# Patient Record
Sex: MALE | Race: WHITE | NOT HISPANIC OR LATINO | URBAN - METROPOLITAN AREA
[De-identification: names, ages, dates, MRNs, and addresses within clinical notes are randomized per-mention and may not be internally consistent; named-entity substitution may affect disease eponyms.]

---

## 2024-10-07 ENCOUNTER — INPATIENT (INPATIENT)
Facility: HOSPITAL | Age: 59
LOS: 1 days | Discharge: HOME HEALTH | End: 2024-10-09
Attending: HOSPITALIST | Admitting: HOSPITALIST
Payer: MEDICAID

## 2024-10-07 VITALS
WEIGHT: 240.08 LBS | RESPIRATION RATE: 16 BRPM | HEART RATE: 106 BPM | SYSTOLIC BLOOD PRESSURE: 198 MMHG | OXYGEN SATURATION: 98 % | TEMPERATURE: 98 F | DIASTOLIC BLOOD PRESSURE: 107 MMHG

## 2024-10-07 DIAGNOSIS — J45.909 UNSPECIFIED ASTHMA, UNCOMPLICATED: ICD-10-CM

## 2024-10-07 DIAGNOSIS — R73.9 HYPERGLYCEMIA, UNSPECIFIED: ICD-10-CM

## 2024-10-07 DIAGNOSIS — I10 ESSENTIAL (PRIMARY) HYPERTENSION: ICD-10-CM

## 2024-10-07 DIAGNOSIS — Z29.9 ENCOUNTER FOR PROPHYLACTIC MEASURES, UNSPECIFIED: ICD-10-CM

## 2024-10-07 DIAGNOSIS — Z90.49 ACQUIRED ABSENCE OF OTHER SPECIFIED PARTS OF DIGESTIVE TRACT: Chronic | ICD-10-CM

## 2024-10-07 DIAGNOSIS — J44.1 CHRONIC OBSTRUCTIVE PULMONARY DISEASE WITH (ACUTE) EXACERBATION: ICD-10-CM

## 2024-10-07 DIAGNOSIS — Z98.890 OTHER SPECIFIED POSTPROCEDURAL STATES: Chronic | ICD-10-CM

## 2024-10-07 DIAGNOSIS — J06.9 ACUTE UPPER RESPIRATORY INFECTION, UNSPECIFIED: ICD-10-CM

## 2024-10-07 LAB
ALBUMIN SERPL ELPH-MCNC: 4.1 G/DL — SIGNIFICANT CHANGE UP (ref 3.3–5)
ALP SERPL-CCNC: 75 U/L — SIGNIFICANT CHANGE UP (ref 40–120)
ALT FLD-CCNC: 18 U/L — SIGNIFICANT CHANGE UP (ref 4–41)
ANION GAP SERPL CALC-SCNC: 14 MMOL/L — SIGNIFICANT CHANGE UP (ref 7–14)
AST SERPL-CCNC: 18 U/L — SIGNIFICANT CHANGE UP (ref 4–40)
BASOPHILS # BLD AUTO: 0.06 K/UL — SIGNIFICANT CHANGE UP (ref 0–0.2)
BASOPHILS NFR BLD AUTO: 0.7 % — SIGNIFICANT CHANGE UP (ref 0–2)
BILIRUB SERPL-MCNC: 0.4 MG/DL — SIGNIFICANT CHANGE UP (ref 0.2–1.2)
BUN SERPL-MCNC: 12 MG/DL — SIGNIFICANT CHANGE UP (ref 7–23)
CALCIUM SERPL-MCNC: 9.2 MG/DL — SIGNIFICANT CHANGE UP (ref 8.4–10.5)
CHLORIDE SERPL-SCNC: 98 MMOL/L — SIGNIFICANT CHANGE UP (ref 98–107)
CO2 SERPL-SCNC: 23 MMOL/L — SIGNIFICANT CHANGE UP (ref 22–31)
CREAT SERPL-MCNC: 0.62 MG/DL — SIGNIFICANT CHANGE UP (ref 0.5–1.3)
EGFR: 109 ML/MIN/1.73M2 — SIGNIFICANT CHANGE UP
EOSINOPHIL # BLD AUTO: 0.33 K/UL — SIGNIFICANT CHANGE UP (ref 0–0.5)
EOSINOPHIL NFR BLD AUTO: 3.9 % — SIGNIFICANT CHANGE UP (ref 0–6)
FLUAV AG NPH QL: SIGNIFICANT CHANGE UP
FLUBV AG NPH QL: SIGNIFICANT CHANGE UP
GLUCOSE BLDC GLUCOMTR-MCNC: 488 MG/DL — CRITICAL HIGH (ref 70–99)
GLUCOSE SERPL-MCNC: 338 MG/DL — HIGH (ref 70–99)
HCT VFR BLD CALC: 45.6 % — SIGNIFICANT CHANGE UP (ref 39–50)
HGB BLD-MCNC: 15.7 G/DL — SIGNIFICANT CHANGE UP (ref 13–17)
IANC: 5.03 K/UL — SIGNIFICANT CHANGE UP (ref 1.8–7.4)
IMM GRANULOCYTES NFR BLD AUTO: 0.2 % — SIGNIFICANT CHANGE UP (ref 0–0.9)
LIDOCAIN IGE QN: 29 U/L — SIGNIFICANT CHANGE UP (ref 7–60)
LYMPHOCYTES # BLD AUTO: 2.26 K/UL — SIGNIFICANT CHANGE UP (ref 1–3.3)
LYMPHOCYTES # BLD AUTO: 26.9 % — SIGNIFICANT CHANGE UP (ref 13–44)
MCHC RBC-ENTMCNC: 30 PG — SIGNIFICANT CHANGE UP (ref 27–34)
MCHC RBC-ENTMCNC: 34.4 GM/DL — SIGNIFICANT CHANGE UP (ref 32–36)
MCV RBC AUTO: 87 FL — SIGNIFICANT CHANGE UP (ref 80–100)
MONOCYTES # BLD AUTO: 0.7 K/UL — SIGNIFICANT CHANGE UP (ref 0–0.9)
MONOCYTES NFR BLD AUTO: 8.3 % — SIGNIFICANT CHANGE UP (ref 2–14)
NEUTROPHILS # BLD AUTO: 5.03 K/UL — SIGNIFICANT CHANGE UP (ref 1.8–7.4)
NEUTROPHILS NFR BLD AUTO: 60 % — SIGNIFICANT CHANGE UP (ref 43–77)
NRBC # BLD: 0 /100 WBCS — SIGNIFICANT CHANGE UP (ref 0–0)
NRBC # FLD: 0 K/UL — SIGNIFICANT CHANGE UP (ref 0–0)
NT-PROBNP SERPL-SCNC: 43 PG/ML — SIGNIFICANT CHANGE UP
PLATELET # BLD AUTO: 327 K/UL — SIGNIFICANT CHANGE UP (ref 150–400)
POTASSIUM SERPL-MCNC: 4 MMOL/L — SIGNIFICANT CHANGE UP (ref 3.5–5.3)
POTASSIUM SERPL-SCNC: 4 MMOL/L — SIGNIFICANT CHANGE UP (ref 3.5–5.3)
PROT SERPL-MCNC: 7.3 G/DL — SIGNIFICANT CHANGE UP (ref 6–8.3)
RBC # BLD: 5.24 M/UL — SIGNIFICANT CHANGE UP (ref 4.2–5.8)
RBC # FLD: 12.3 % — SIGNIFICANT CHANGE UP (ref 10.3–14.5)
RSV RNA NPH QL NAA+NON-PROBE: SIGNIFICANT CHANGE UP
SARS-COV-2 RNA SPEC QL NAA+PROBE: SIGNIFICANT CHANGE UP
SODIUM SERPL-SCNC: 135 MMOL/L — SIGNIFICANT CHANGE UP (ref 135–145)
TROPONIN T, HIGH SENSITIVITY RESULT: 13 NG/L — SIGNIFICANT CHANGE UP
WBC # BLD: 8.4 K/UL — SIGNIFICANT CHANGE UP (ref 3.8–10.5)
WBC # FLD AUTO: 8.4 K/UL — SIGNIFICANT CHANGE UP (ref 3.8–10.5)

## 2024-10-07 PROCEDURE — 99223 1ST HOSP IP/OBS HIGH 75: CPT | Mod: GC

## 2024-10-07 PROCEDURE — 99285 EMERGENCY DEPT VISIT HI MDM: CPT

## 2024-10-07 PROCEDURE — 71046 X-RAY EXAM CHEST 2 VIEWS: CPT | Mod: 26

## 2024-10-07 RX ORDER — IPRATROPIUM BROMIDE AND ALBUTEROL SULFATE .5; 3 MG/3ML; MG/3ML
3 SOLUTION RESPIRATORY (INHALATION)
Refills: 0 | Status: COMPLETED | OUTPATIENT
Start: 2024-10-07 | End: 2024-10-07

## 2024-10-07 RX ORDER — SENNOSIDES 8.6 MG
2 TABLET ORAL AT BEDTIME
Refills: 0 | Status: DISCONTINUED | OUTPATIENT
Start: 2024-10-07 | End: 2024-10-09

## 2024-10-07 RX ORDER — IPRATROPIUM BROMIDE AND ALBUTEROL SULFATE .5; 3 MG/3ML; MG/3ML
3 SOLUTION RESPIRATORY (INHALATION) ONCE
Refills: 0 | Status: DISCONTINUED | OUTPATIENT
Start: 2024-10-07 | End: 2024-10-07

## 2024-10-07 RX ORDER — IPRATROPIUM BROMIDE AND ALBUTEROL SULFATE .5; 3 MG/3ML; MG/3ML
3 SOLUTION RESPIRATORY (INHALATION) ONCE
Refills: 0 | Status: COMPLETED | OUTPATIENT
Start: 2024-10-07 | End: 2024-10-07

## 2024-10-07 RX ORDER — GLUCAGON INJECTION, SOLUTION 0.5 MG/.1ML
1 INJECTION, SOLUTION SUBCUTANEOUS ONCE
Refills: 0 | Status: DISCONTINUED | OUTPATIENT
Start: 2024-10-07 | End: 2024-10-09

## 2024-10-07 RX ORDER — IPRATROPIUM BROMIDE AND ALBUTEROL SULFATE .5; 3 MG/3ML; MG/3ML
3 SOLUTION RESPIRATORY (INHALATION) EVERY 6 HOURS
Refills: 0 | Status: DISCONTINUED | OUTPATIENT
Start: 2024-10-07 | End: 2024-10-09

## 2024-10-07 RX ORDER — AZITHROMYCIN 250 MG/1
500 TABLET, FILM COATED ORAL ONCE
Refills: 0 | Status: COMPLETED | OUTPATIENT
Start: 2024-10-07 | End: 2024-10-07

## 2024-10-07 RX ORDER — PREDNISONE 5 MG/1
40 TABLET ORAL DAILY
Refills: 0 | Status: DISCONTINUED | OUTPATIENT
Start: 2024-10-08 | End: 2024-10-09

## 2024-10-07 RX ORDER — INFLUENZA VIRUS VACCINE 15; 15; 15; 15 UG/.5ML; UG/.5ML; UG/.5ML; UG/.5ML
0.5 SUSPENSION INTRAMUSCULAR ONCE
Refills: 0 | Status: DISCONTINUED | OUTPATIENT
Start: 2024-10-07 | End: 2024-10-09

## 2024-10-07 RX ORDER — INSULIN LISPRO 100/ML
VIAL (ML) SUBCUTANEOUS AT BEDTIME
Refills: 0 | Status: DISCONTINUED | OUTPATIENT
Start: 2024-10-07 | End: 2024-10-08

## 2024-10-07 RX ORDER — INSULIN LISPRO 100/ML
VIAL (ML) SUBCUTANEOUS
Refills: 0 | Status: DISCONTINUED | OUTPATIENT
Start: 2024-10-07 | End: 2024-10-08

## 2024-10-07 RX ORDER — ALCOHOL ANTISEPTIC PADS
25 PADS, MEDICATED (EA) TOPICAL ONCE
Refills: 0 | Status: DISCONTINUED | OUTPATIENT
Start: 2024-10-07 | End: 2024-10-09

## 2024-10-07 RX ORDER — ACETAMINOPHEN 325 MG
1000 TABLET ORAL ONCE
Refills: 0 | Status: COMPLETED | OUTPATIENT
Start: 2024-10-07 | End: 2024-10-07

## 2024-10-07 RX ORDER — SODIUM CHLORIDE IRRIG SOLUTION 0.9 %
1000 SOLUTION, IRRIGATION IRRIGATION
Refills: 0 | Status: DISCONTINUED | OUTPATIENT
Start: 2024-10-07 | End: 2024-10-09

## 2024-10-07 RX ORDER — ACETAMINOPHEN 325 MG
650 TABLET ORAL ONCE
Refills: 0 | Status: DISCONTINUED | OUTPATIENT
Start: 2024-10-07 | End: 2024-10-07

## 2024-10-07 RX ORDER — AZITHROMYCIN 250 MG/1
500 TABLET, FILM COATED ORAL DAILY
Refills: 0 | Status: COMPLETED | OUTPATIENT
Start: 2024-10-08 | End: 2024-10-09

## 2024-10-07 RX ORDER — ALCOHOL ANTISEPTIC PADS
15 PADS, MEDICATED (EA) TOPICAL ONCE
Refills: 0 | Status: DISCONTINUED | OUTPATIENT
Start: 2024-10-07 | End: 2024-10-09

## 2024-10-07 RX ORDER — MAGNESIUM SULFATE 500 MG/ML
2 VIAL (ML) INJECTION ONCE
Refills: 0 | Status: COMPLETED | OUTPATIENT
Start: 2024-10-07 | End: 2024-10-07

## 2024-10-07 RX ORDER — ALCOHOL ANTISEPTIC PADS
12.5 PADS, MEDICATED (EA) TOPICAL ONCE
Refills: 0 | Status: DISCONTINUED | OUTPATIENT
Start: 2024-10-07 | End: 2024-10-09

## 2024-10-07 RX ORDER — ENOXAPARIN SODIUM 150 MG/ML
40 INJECTION SUBCUTANEOUS EVERY 24 HOURS
Refills: 0 | Status: DISCONTINUED | OUTPATIENT
Start: 2024-10-07 | End: 2024-10-09

## 2024-10-07 RX ADMIN — IPRATROPIUM BROMIDE AND ALBUTEROL SULFATE 3 MILLILITER(S): .5; 3 SOLUTION RESPIRATORY (INHALATION) at 21:48

## 2024-10-07 RX ADMIN — AZITHROMYCIN 500 MILLIGRAM(S): 250 TABLET, FILM COATED ORAL at 16:10

## 2024-10-07 RX ADMIN — IPRATROPIUM BROMIDE AND ALBUTEROL SULFATE 3 MILLILITER(S): .5; 3 SOLUTION RESPIRATORY (INHALATION) at 09:55

## 2024-10-07 RX ADMIN — Medication 6 MILLIGRAM(S): at 09:29

## 2024-10-07 RX ADMIN — ENOXAPARIN SODIUM 40 MILLIGRAM(S): 150 INJECTION SUBCUTANEOUS at 22:53

## 2024-10-07 RX ADMIN — IPRATROPIUM BROMIDE AND ALBUTEROL SULFATE 3 MILLILITER(S): .5; 3 SOLUTION RESPIRATORY (INHALATION) at 12:12

## 2024-10-07 RX ADMIN — IPRATROPIUM BROMIDE AND ALBUTEROL SULFATE 3 MILLILITER(S): .5; 3 SOLUTION RESPIRATORY (INHALATION) at 09:33

## 2024-10-07 RX ADMIN — Medication 400 MILLIGRAM(S): at 09:29

## 2024-10-07 RX ADMIN — Medication 4: at 23:07

## 2024-10-07 RX ADMIN — Medication 150 GRAM(S): at 09:29

## 2024-10-07 RX ADMIN — Medication 2 TABLET(S): at 22:53

## 2024-10-07 RX ADMIN — IPRATROPIUM BROMIDE AND ALBUTEROL SULFATE 3 MILLILITER(S): .5; 3 SOLUTION RESPIRATORY (INHALATION) at 10:15

## 2024-10-07 NOTE — ED PROVIDER NOTE - ATTENDING CONTRIBUTION TO CARE
The patient is a 58y Male heavy smoker who has a past medical and surgery history of  Asthma PTED with Pt history of asthma, presents for fever, chest pain and shortness of breath for the past week, endorses pain to Lt side of chest, non-radiating, non- reproducible, afebrile.  fever, chest pain, shortness of breath     Vital Signs Last 24 Hrs  T(F): 98.1 HR: 106 BP: 198/107 RR: 16 SpO2: 98% (07 Oct 2024 08:50) (98% - 98%)  PE: as described; my additions and exceptions are noted in the chart    DATA:  EKG: pending at time of evaluation  LAB: Pending at time of evaluation    IMPRESSION/RISK:  Dx= COPD  Consideration include: nebs mag steroids given with significant response but not enough to warrant discharge at this time   Will continue nebs pt not requring NIV at this time   will also add azithro and check viral panel  Will reassess  Plan

## 2024-10-07 NOTE — H&P ADULT - PROBLEM SELECTOR PLAN 2
Patient has history of hypertension which he manages off medication outpatient. Systolic blood pressure fluctuates between 130s-190s/80s-100s. Patient endorsed history of anxiety prior to admission, also managed off of medication.    - Continue to monitor  - Consider initiation of low dose ARB if patient amenable

## 2024-10-07 NOTE — H&P ADULT - PROBLEM SELECTOR PLAN 1
The patient has a 40 pack-year smoking history and asthma and presented with acute hypoxia with SpO2 at 89%, improved following steroids, duonebs, and antibiotics initiation.    - On 2L NC, wean O2 as tolerated  - Continue duonebs q6h  - Continue prednisone 40mg PO for 5 day course  - Continue azithromycin 500mg for 3 day course  - RSV/Covid/Flu negative  - CXR clear The patient has a 40 pack-year smoking history and asthma and presented with acute hypoxia with SpO2 at 89%, improved following steroids, duonebs, and antibiotics initiation.    - On 2L NC, wean O2 as tolerated  - Continue duonebs q6h  - Continue prednisone 40mg PO for 5 day course  - Continue azithromycin 500mg for 3 day course  - Blood gas in AM  - RSV/Covid/Flu negative  - CXR clear    - Will require pulmonology follow-up outpatient

## 2024-10-07 NOTE — ED ADULT NURSE NOTE - OBJECTIVE STATEMENT
Patient came in with the complaints of sob, cough and fever started a week ago. Patient stated he using Nyquil and Tylenol with minimal relief.  No other complaints. Specimens collected and sent. Medications given as ordered. Patient tolerated well. Nursing care continues

## 2024-10-07 NOTE — ED PROVIDER NOTE - PHYSICAL EXAMINATION
Constitutional: Obese, well-appearing. No acute distress.   Head: Normocephalic, atraumatic.   Eyes: PERRL. EOMI. No conjunctival pallor.   ENT: Dry mucous membranes. Uvula midline. No pharyngeal erythema or exudates.  Neck: No LAD. Supple.  CVS: Tachycardic rate, regular rhythm. Normal S1, S2. No murmurs, rubs, or gallops. No peripheral edema noted.   Respiratory: No respiratory distress. Diffuse inspiratory and expiratory wheezes bilaterally. No rales, or rhonchi.   Abdomen: Abd is soft and non-distended. No tenderness. No rebound, guarding, or rigidity.   MSK: No CVA tenderness bilaterally.   Neuro: Alert and oriented to person, place, and time. Follows commands. Moves all extremities. CN II-XII intact. Sensation grossly intact. Strength is 5/5 and symmetric bilaterally.   Skin: Warm and dry. No rashes.   Psych: Normal affect, cooperative.

## 2024-10-07 NOTE — H&P ADULT - ASSESSMENT
The patient is a 58-year-old male with past medical history of untreated hypertension, asthma, daily smoker with 40-pack year smoking history, and anxiety presenting with acute hypoxia secondary to likely COPD exacerbation.

## 2024-10-07 NOTE — H&P ADULT - HISTORY OF PRESENT ILLNESS
The patient is a 58-year-old male with past medical history of untreated hypertension, asthma, obesity, daily smoker, and anxiety who presented for evaluation of left-sided chest pain that began at 4 AM on 10/7.  The patient had been experiencing URI-like symptoms over the past week with subjective fever, chills, generalized myalgias, productive cough, nasal congestion, and rhinorrhea which the patient had been treating with Tylenol and DayQuil/NyQuil as needed. On the morning of 10/7, the patient awoke with left-sided sharp chest pain and a sensation of numbness and tingling to his toes as well as his left upper extremity. The patient prefers to avoid seeking medical care, but was heavily encouraged by his family to report to the hospital given his presenting symptoms.    ED Course  T: Afebrile, HR: 106, BP: 198/107, SpO2 98% RA    Patient stated that his pain was intermittent and associated with difficulty breathing and wheezing.  He denied any nausea, vomiting, diarrhea, abdominal pain, dysuria, hematuria, flank pain, headache, changes in vision, changes in speech, weakness, recent travel, or sick contacts.    On arrival to the ED, the patient appeared comfortable at the bedside with diffuse inspiratory and expiratory wheezes to bilateral lung fields. EKG on admission consistent with sinus tach and 1st degree AV block with non-specific T-wave abnormalities.    Patient given duonebs, magnesium sulfate, and dexamethasone. Viral panel negative. CXR within normal limits. Labs unremarkable. Patient with continued inspiratory and expiratory wheezes on exam and documented hypoxia to 89% on room air, now on 2LNC with improvement to 96%. Azithromycin added for COPD exacerbation. The patient is a 58-year-old male with past medical history of untreated hypertension, asthma, obesity, daily smoker, and anxiety who presented for evaluation of left-sided chest pain that began at 4 AM on 10/7.  The patient had been experiencing URI-like symptoms over the past week with subjective fever, chills, generalized myalgias, productive cough, nasal congestion, and rhinorrhea which the patient had been treating with Tylenol and DayQuil/NyQuil as needed. On the morning of 10/7, the patient awoke with left-sided sharp chest pain and a sensation of numbness and tingling to his toes as well as his left upper extremity. The patient prefers to avoid seeking medical care, but was heavily encouraged by his family to report to the hospital given his presenting symptoms.    ED Course  T: Afebrile, HR: 106, BP: 198/107, SpO2 98% RA    Patient stated that his pain was intermittent and associated with difficulty breathing and wheezing.  He denied any nausea, vomiting, diarrhea, abdominal pain, dysuria, hematuria, flank pain, headache, changes in vision, changes in speech, weakness, recent travel, or sick contacts.    EKG on admission consistent with sinus tach and 1st degree AV block with non-specific T-wave abnormalities. Subsequent troponin was wnl (13). The patient appeared comfortable at the bedside with diffuse inspiratory and expiratory wheezes to bilateral lung fields. The patient eventually desaturated to 89% on RA.    Patient given duonebs, magnesium sulfate, and dexamethasone. Viral panel negative. CXR within normal limits. Labs unremarkable. Patient placed on 2LNC with improvement in SpO2 to 96%-99%. Azithromycin added for COPD exacerbation, and patient admitted to medicine for continued airway monitoring and management of COPD exacerbation.

## 2024-10-07 NOTE — H&P ADULT - PROBLEM SELECTOR PLAN 3
The patient has a history of asthma which he manages at home with doses of over the counter ephedrine 1-2 times/week.    - O2 management as above  - Airway management as above The patient has a history of asthma which he manages at home with doses of over the counter ephedrine 1-2 times/week.    - O2 management as above  - Airway management as above  - Pulmonology follow-up outpatient

## 2024-10-07 NOTE — ED PROVIDER NOTE - CLINICAL SUMMARY MEDICAL DECISION MAKING FREE TEXT BOX
Fellow MD Suresh Oneil: 58-year-old male with past medical history of of untreated hypertension, asthma, obesity, daily smoker, presenting to the ED for evaluation of left-sided chest pain that began at 4 AM.  As per patient, he states that he has been experiencing URI-like symptoms over the past week with subjective fever, chills, generalized myalgias, productive cough, nasal congestion, rhinorrhea which she has been treating with Tylenol and DayQuil/NyQuil as needed.  This morning, the patient awoke with left-sided sharp chest pain and a sensation of numbness and tingling to his toes as well as his left upper extremity.  Patient states that the pain is intermittent and associated with difficulty breathing and wheezing.  He denies any nausea, vomiting, diarrhea, abdominal pain, dysuria, hematuria, flank pain, headache, changes in vision, changes in speech, weakness, and any additional complaints at this time.  No recent travel or sick contacts.    On arrival to the ED, the patient is hypertensive, tachycardic, afebrile, and satting 98% on room air.  Patient appears comfortable at the bedside.  However, patient has diffuse inspiratory and expiratory wheezes to bilateral lung fields.  Plan for DuoNebs, magnesium sulfate, and dexamethasone.  Will also send viral panel to evaluate for infectious causes of symptoms.  Plan for chest x-ray, labs, troponin, BMP, EKG to evaluate for chest pain.  Patient agreeable with plan. Fellow MD Suresh Oneil: 58-year-old male with past medical history of of untreated hypertension, asthma, obesity, daily smoker, presenting to the ED for evaluation of left-sided chest pain that began at 4 AM.  As per patient, he states that he has been experiencing URI-like symptoms over the past week with subjective fever, chills, generalized myalgias, productive cough, nasal congestion, rhinorrhea which she has been treating with Tylenol and DayQuil/NyQuil as needed.  This morning, the patient awoke with left-sided sharp chest pain and a sensation of numbness and tingling to his toes as well as his left upper extremity.  Patient states that the pain is intermittent and associated with difficulty breathing and wheezing.  He denies any nausea, vomiting, diarrhea, abdominal pain, dysuria, hematuria, flank pain, headache, changes in vision, changes in speech, weakness, and any additional complaints at this time.  No recent travel or sick contacts.    On arrival to the ED, the patient is hypertensive, tachycardic, afebrile, and satting 98% on room air.  Patient appears comfortable at the bedside.  However, patient has diffuse inspiratory and expiratory wheezes to bilateral lung fields.  Plan for DuoNebs, magnesium sulfate, and dexamethasone.  Will also send viral panel to evaluate for infectious causes of symptoms.  Plan for chest x-ray, labs, troponin, BMP, EKG to evaluate for chest pain.  Patient agreeable with plan. Viral panel negative. CXR within normal limits. Labs unremarkable. Patient with continued inspiratory and expiratory wheezes on exam and documented hypoxia to 89% on room air, now on 2LNC with improvement to 96%. Azithromycin added for COPD exacerbation.

## 2024-10-07 NOTE — H&P ADULT - NSHPLABSRESULTS_GEN_ALL_CORE
LABS:                         15.7   8.40  )-----------( 327      ( 07 Oct 2024 09:15 )             45.6     10-07    135  |  98  |  12  ----------------------------<  338[H]  4.0   |  23  |  0.62    Ca    9.2      07 Oct 2024 09:15    TPro  7.3  /  Alb  4.1  /  TBili  0.4  /  DBili  x   /  AST  18  /  ALT  18  /  AlkPhos  75  10-07      Urinalysis Basic - ( 07 Oct 2024 09:15 )    Color: x / Appearance: x / SG: x / pH: x  Gluc: 338 mg/dL / Ketone: x  / Bili: x / Urobili: x   Blood: x / Protein: x / Nitrite: x   Leuk Esterase: x / RBC: x / WBC x   Sq Epi: x / Non Sq Epi: x / Bacteria: x                RADIOLOGY, EKG & ADDITIONAL TESTS: LABS:                         15.7   8.40  )-----------( 327      ( 07 Oct 2024 09:15 )             45.6     10-07    135  |  98  |  12  ----------------------------<  338[H]  4.0   |  23  |  0.62    Ca    9.2      07 Oct 2024 09:15    TPro  7.3  /  Alb  4.1  /  TBili  0.4  /  DBili  x   /  AST  18  /  ALT  18  /  AlkPhos  75  10-07      Urinalysis Basic - ( 07 Oct 2024 09:15 )    Color: x / Appearance: x / SG: x / pH: x  Gluc: 338 mg/dL / Ketone: x  / Bili: x / Urobili: x   Blood: x / Protein: x / Nitrite: x   Leuk Esterase: x / RBC: x / WBC x   Sq Epi: x / Non Sq Epi: x / Bacteria: x                RADIOLOGY, EKG & ADDITIONAL TESTS:    EKG Date/Time: 07-Oct-2024 08:52    Interpretation: sinus tachycardia  Axis: Normal  Rate: 106  Prior EKG Status: there is no prior EKG available for comparison  ST/T Wave: Non-specific T wave abnormalities.  QTC: 443 ms  QRS: 98 ms  IN: 204 ms  Other Findings: 1st degree AV block.      Xray Chest 2 Views PA/Lat    FINDINGS: Normal cardiac silhouette and normal pulmonary vasculature with   no consolidation, effusion, pneumothorax or acute osseous findings.    IMPRESSION:  Negative radiographs

## 2024-10-07 NOTE — ED PROVIDER NOTE - CARE PLAN
Principal Discharge DX:	URI (upper respiratory infection)  Secondary Diagnosis:	Acute asthma exacerbation  Secondary Diagnosis:	Hypertension   1

## 2024-10-07 NOTE — ED ADULT TRIAGE NOTE - CHIEF COMPLAINT QUOTE
Pt history of asthma, presents for fever, chest pain and shortness of breath for the past week, endorses pain to Lt side of chest, non-radiating, non- reproducible, afebrile.

## 2024-10-07 NOTE — H&P ADULT - PROBLEM SELECTOR PLAN 5
Activity: Advance as tolerated  DVT ppx: Lovenox   Diet: DASH/TLC  Dispo: Pending PT   Directive: Full Code  Electrolytes: Replete as necessary

## 2024-10-07 NOTE — H&P ADULT - NSHPPHYSICALEXAM_GEN_ALL_CORE
Vital Signs Last 12 Hrs  T(F): 98.8 (10-07-24 @ 16:39), Max: 98.8 (10-07-24 @ 16:39)  HR: 96 (10-07-24 @ 16:39) (90 - 106)  BP: 144/76 (10-07-24 @ 16:39) (142/77 - 198/107)  BP(mean): 106 (10-07-24 @ 09:53) (106 - 106)  RR: 16 (10-07-24 @ 16:39) (16 - 16)  SpO2: 99% (10-07-24 @ 16:39) (98% - 99%)    PHYSICAL EXAM:  Constitutional: NAD, comfortable in bed.  HEENT: NC/AT, PERRLA, EOMI, no conjunctival pallor or scleral icterus, MMM  Neck: Supple, no JVD  Respiratory: CTA B/L. No w/r/r.   Cardiovascular: RRR, normal S1 and S2, no m/r/g.   Gastrointestinal: +BS, soft NTND, no guarding or rebound tenderness, no palpable masses   Extremities: wwp; no cyanosis, clubbing or edema.   Vascular: Pulses equal and strong throughout.   Neurological: AAOx3, no CN deficits, strength and sensation intact throughout.   Skin: No gross skin abnormalities or rashes Vital Signs Last 12 Hrs  T(F): 98.8 (10-07-24 @ 16:39), Max: 98.8 (10-07-24 @ 16:39)  HR: 96 (10-07-24 @ 16:39) (90 - 106)  BP: 144/76 (10-07-24 @ 16:39) (142/77 - 198/107)  BP(mean): 106 (10-07-24 @ 09:53) (106 - 106)  RR: 16 (10-07-24 @ 16:39) (16 - 16)  SpO2: 99% (10-07-24 @ 16:39) (98% - 99%)    PHYSICAL EXAM:  Constitutional: NAD, comfortable in bed.  HEENT: NC/AT, PERRLA, EOMI, no conjunctival pallor or scleral icterus, MMM  Neck: Supple, no JVD  Respiratory: Trace expiratory and inspiratory wheezes b/l  Cardiovascular: RRR, normal S1 and S2, no m/r/g.   Gastrointestinal: +BS, soft NTND, no guarding or rebound tenderness, no palpable masses   Extremities: wwp; no cyanosis, clubbing or edema.   Vascular: Pulses equal and strong throughout.   Neurological: AAOx3, no CN deficits, strength and sensation intact throughout.   Skin: No gross skin abnormalities or rashes

## 2024-10-07 NOTE — H&P ADULT - PROBLEM SELECTOR PLAN 4
The patient presented with elevated blood glucose to 338. Patient does not have a history of diabetes nor takes any antiglycemic agents. Patient denied PO intake prior to arrival to hospital, and patient is starting round of steroid treatment for COPD.    - A1c in AM  - Lipid panel in AM  - Continue to trend  - Low threshold to initiate low insulin correctional scale with corresponding fingersticks to manage hyperglycemia given current treatment regimen The patient presented with elevated blood glucose to 338. Patient does not have a history of diabetes nor takes any antiglycemic agents. Patient denied PO intake prior to arrival to hospital, and patient is starting round of steroid treatment for COPD.    - A1c in AM  - Lipid panel in AM  - Continue to trend  - Start low-dose insulin correctional scale pre-meal and at bedtime  - Fingersticks per protocol

## 2024-10-07 NOTE — H&P ADULT - NSHPREVIEWOFSYSTEMS_GEN_ALL_CORE
Gen: No fever, normal appetite  Eyes: No eye irritation or discharge  ENT: No ear pain, congestion, sore throat  Resp: No cough or trouble breathing  Cardiovascular: No chest pain or palpitation  Gastroenteric: No nausea/vomiting, diarrhea, constipation  :  No change in urine output; no dysuria  MS: No joint or muscle pain  Skin: No rashes  Neuro: No headache; no abnormal movements  Remainder negative, except as per the HPI Gen: No fever, normal appetite  Eyes: No eye irritation or discharge  ENT: No ear pain, congestion, sore throat  Resp: See HPI  Cardiovascular: No chest pain or palpitation  Gastroenteric: No nausea/vomiting, diarrhea, constipation  : Increased urinary frequency with strain, non-painful, no hematuria  MS: No joint or muscle pain  Skin: No rashes  Neuro: No headache; no abnormal movements  Remainder negative, except as per the HPI

## 2024-10-07 NOTE — H&P ADULT - ATTENDING COMMENTS
Pt seen and examined by me on 10/7. Agree with findings and plan as detailed above which was discussed with Dr. Mariee.  Pt with self-reported h/o HTN, asthma, anxiety, current smoker who presented with few days of URI symptoms and new onset chest tightness, found to have hypoxia and diffuse wheezing on exam. Suspect asthma/COPD exacerbation. Flu/COVID/RSV negative and CXR unremarkable. C/w steroids, azithromycin, duonebs. Needs outpatient referral for PCP and pulmonary evaluation.

## 2024-10-08 DIAGNOSIS — E78.5 HYPERLIPIDEMIA, UNSPECIFIED: ICD-10-CM

## 2024-10-08 DIAGNOSIS — E11.9 TYPE 2 DIABETES MELLITUS WITHOUT COMPLICATIONS: ICD-10-CM

## 2024-10-08 DIAGNOSIS — R73.9 HYPERGLYCEMIA, UNSPECIFIED: ICD-10-CM

## 2024-10-08 LAB
A1C WITH ESTIMATED AVERAGE GLUCOSE RESULT: 11.4 % — HIGH (ref 4–5.6)
ALBUMIN SERPL ELPH-MCNC: 4.1 G/DL — SIGNIFICANT CHANGE UP (ref 3.3–5)
ALP SERPL-CCNC: 78 U/L — SIGNIFICANT CHANGE UP (ref 40–120)
ALT FLD-CCNC: 22 U/L — SIGNIFICANT CHANGE UP (ref 4–41)
ANION GAP SERPL CALC-SCNC: 16 MMOL/L — HIGH (ref 7–14)
AST SERPL-CCNC: 14 U/L — SIGNIFICANT CHANGE UP (ref 4–40)
BASOPHILS # BLD AUTO: 0.02 K/UL — SIGNIFICANT CHANGE UP (ref 0–0.2)
BASOPHILS NFR BLD AUTO: 0.2 % — SIGNIFICANT CHANGE UP (ref 0–2)
BILIRUB SERPL-MCNC: 0.2 MG/DL — SIGNIFICANT CHANGE UP (ref 0.2–1.2)
BLOOD GAS VENOUS COMPREHENSIVE RESULT: SIGNIFICANT CHANGE UP
BUN SERPL-MCNC: 17 MG/DL — SIGNIFICANT CHANGE UP (ref 7–23)
CALCIUM SERPL-MCNC: 9.5 MG/DL — SIGNIFICANT CHANGE UP (ref 8.4–10.5)
CHLORIDE SERPL-SCNC: 98 MMOL/L — SIGNIFICANT CHANGE UP (ref 98–107)
CHOLEST SERPL-MCNC: 269 MG/DL — HIGH
CO2 SERPL-SCNC: 22 MMOL/L — SIGNIFICANT CHANGE UP (ref 22–31)
CREAT SERPL-MCNC: 0.66 MG/DL — SIGNIFICANT CHANGE UP (ref 0.5–1.3)
EGFR: 107 ML/MIN/1.73M2 — SIGNIFICANT CHANGE UP
EOSINOPHIL # BLD AUTO: 0.03 K/UL — SIGNIFICANT CHANGE UP (ref 0–0.5)
EOSINOPHIL NFR BLD AUTO: 0.2 % — SIGNIFICANT CHANGE UP (ref 0–6)
ESTIMATED AVERAGE GLUCOSE: 280 — SIGNIFICANT CHANGE UP
GLUCOSE BLDC GLUCOMTR-MCNC: 193 MG/DL — HIGH (ref 70–99)
GLUCOSE BLDC GLUCOMTR-MCNC: 273 MG/DL — HIGH (ref 70–99)
GLUCOSE BLDC GLUCOMTR-MCNC: 287 MG/DL — HIGH (ref 70–99)
GLUCOSE BLDC GLUCOMTR-MCNC: 357 MG/DL — HIGH (ref 70–99)
GLUCOSE BLDC GLUCOMTR-MCNC: 486 MG/DL — CRITICAL HIGH (ref 70–99)
GLUCOSE SERPL-MCNC: 284 MG/DL — HIGH (ref 70–99)
HCT VFR BLD CALC: 45 % — SIGNIFICANT CHANGE UP (ref 39–50)
HDLC SERPL-MCNC: 43 MG/DL — SIGNIFICANT CHANGE UP
HGB BLD-MCNC: 15.3 G/DL — SIGNIFICANT CHANGE UP (ref 13–17)
IANC: 8.88 K/UL — HIGH (ref 1.8–7.4)
IMM GRANULOCYTES NFR BLD AUTO: 0.3 % — SIGNIFICANT CHANGE UP (ref 0–0.9)
LACTATE SERPL-SCNC: 2.1 MMOL/L — HIGH (ref 0.5–2)
LIPID PNL WITH DIRECT LDL SERPL: 166 MG/DL — HIGH
LYMPHOCYTES # BLD AUTO: 1.99 K/UL — SIGNIFICANT CHANGE UP (ref 1–3.3)
LYMPHOCYTES # BLD AUTO: 16.4 % — SIGNIFICANT CHANGE UP (ref 13–44)
MAGNESIUM SERPL-MCNC: 2.2 MG/DL — SIGNIFICANT CHANGE UP (ref 1.6–2.6)
MCHC RBC-ENTMCNC: 30 PG — SIGNIFICANT CHANGE UP (ref 27–34)
MCHC RBC-ENTMCNC: 34 GM/DL — SIGNIFICANT CHANGE UP (ref 32–36)
MCV RBC AUTO: 88.2 FL — SIGNIFICANT CHANGE UP (ref 80–100)
MONOCYTES # BLD AUTO: 1.14 K/UL — HIGH (ref 0–0.9)
MONOCYTES NFR BLD AUTO: 9.4 % — SIGNIFICANT CHANGE UP (ref 2–14)
MRSA PCR RESULT.: SIGNIFICANT CHANGE UP
NEUTROPHILS # BLD AUTO: 8.88 K/UL — HIGH (ref 1.8–7.4)
NEUTROPHILS NFR BLD AUTO: 73.5 % — SIGNIFICANT CHANGE UP (ref 43–77)
NON HDL CHOLESTEROL: 226 MG/DL — HIGH
NRBC # BLD: 0 /100 WBCS — SIGNIFICANT CHANGE UP (ref 0–0)
NRBC # FLD: 0 K/UL — SIGNIFICANT CHANGE UP (ref 0–0)
PHOSPHATE SERPL-MCNC: 3.5 MG/DL — SIGNIFICANT CHANGE UP (ref 2.5–4.5)
PLATELET # BLD AUTO: 358 K/UL — SIGNIFICANT CHANGE UP (ref 150–400)
POTASSIUM SERPL-MCNC: 4.3 MMOL/L — SIGNIFICANT CHANGE UP (ref 3.5–5.3)
POTASSIUM SERPL-SCNC: 4.3 MMOL/L — SIGNIFICANT CHANGE UP (ref 3.5–5.3)
PROT SERPL-MCNC: 7.3 G/DL — SIGNIFICANT CHANGE UP (ref 6–8.3)
RBC # BLD: 5.1 M/UL — SIGNIFICANT CHANGE UP (ref 4.2–5.8)
RBC # FLD: 12.4 % — SIGNIFICANT CHANGE UP (ref 10.3–14.5)
S AUREUS DNA NOSE QL NAA+PROBE: SIGNIFICANT CHANGE UP
SODIUM SERPL-SCNC: 136 MMOL/L — SIGNIFICANT CHANGE UP (ref 135–145)
TRIGL SERPL-MCNC: 298 MG/DL — HIGH
WBC # BLD: 12.1 K/UL — HIGH (ref 3.8–10.5)
WBC # FLD AUTO: 12.1 K/UL — HIGH (ref 3.8–10.5)

## 2024-10-08 PROCEDURE — 99255 IP/OBS CONSLTJ NEW/EST HI 80: CPT | Mod: GC

## 2024-10-08 PROCEDURE — 99233 SBSQ HOSP IP/OBS HIGH 50: CPT | Mod: GC

## 2024-10-08 RX ORDER — AMLODIPINE BESYLATE 5 MG
5 TABLET ORAL DAILY
Refills: 0 | Status: DISCONTINUED | OUTPATIENT
Start: 2024-10-08 | End: 2024-10-09

## 2024-10-08 RX ORDER — INSULIN LISPRO 100/ML
7 VIAL (ML) SUBCUTANEOUS
Refills: 0 | Status: DISCONTINUED | OUTPATIENT
Start: 2024-10-08 | End: 2024-10-08

## 2024-10-08 RX ORDER — INSULIN LISPRO 100/ML
10 VIAL (ML) SUBCUTANEOUS
Refills: 0 | Status: DISCONTINUED | OUTPATIENT
Start: 2024-10-08 | End: 2024-10-09

## 2024-10-08 RX ORDER — INSULIN LISPRO 100/ML
VIAL (ML) SUBCUTANEOUS
Refills: 0 | Status: DISCONTINUED | OUTPATIENT
Start: 2024-10-08 | End: 2024-10-09

## 2024-10-08 RX ORDER — ALBUTEROL 90 MCG
2 AEROSOL (GRAM) INHALATION EVERY 6 HOURS
Refills: 0 | Status: DISCONTINUED | OUTPATIENT
Start: 2024-10-08 | End: 2024-10-09

## 2024-10-08 RX ORDER — INSULIN LISPRO 100/ML
VIAL (ML) SUBCUTANEOUS AT BEDTIME
Refills: 0 | Status: DISCONTINUED | OUTPATIENT
Start: 2024-10-08 | End: 2024-10-09

## 2024-10-08 RX ORDER — INSULIN GLARGINE 300 U/ML
22 INJECTION, SOLUTION SUBCUTANEOUS AT BEDTIME
Refills: 0 | Status: DISCONTINUED | OUTPATIENT
Start: 2024-10-08 | End: 2024-10-09

## 2024-10-08 RX ORDER — AMLODIPINE BESYLATE 5 MG
5 TABLET ORAL DAILY
Refills: 0 | Status: DISCONTINUED | OUTPATIENT
Start: 2024-10-08 | End: 2024-10-08

## 2024-10-08 RX ORDER — INSULIN GLARGINE 300 U/ML
21 INJECTION, SOLUTION SUBCUTANEOUS AT BEDTIME
Refills: 0 | Status: DISCONTINUED | OUTPATIENT
Start: 2024-10-08 | End: 2024-10-08

## 2024-10-08 RX ADMIN — IPRATROPIUM BROMIDE AND ALBUTEROL SULFATE 3 MILLILITER(S): .5; 3 SOLUTION RESPIRATORY (INHALATION) at 16:34

## 2024-10-08 RX ADMIN — PREDNISONE 40 MILLIGRAM(S): 5 TABLET ORAL at 05:47

## 2024-10-08 RX ADMIN — Medication 3: at 08:40

## 2024-10-08 RX ADMIN — Medication 5 MILLIGRAM(S): at 10:23

## 2024-10-08 RX ADMIN — INSULIN GLARGINE 22 UNIT(S): 300 INJECTION, SOLUTION SUBCUTANEOUS at 22:32

## 2024-10-08 RX ADMIN — Medication 2 PUFF(S): at 21:49

## 2024-10-08 RX ADMIN — Medication 3: at 18:18

## 2024-10-08 RX ADMIN — Medication 2 TABLET(S): at 21:49

## 2024-10-08 RX ADMIN — Medication 7 UNIT(S): at 12:38

## 2024-10-08 RX ADMIN — AZITHROMYCIN 500 MILLIGRAM(S): 250 TABLET, FILM COATED ORAL at 12:39

## 2024-10-08 RX ADMIN — ENOXAPARIN SODIUM 40 MILLIGRAM(S): 150 INJECTION SUBCUTANEOUS at 18:18

## 2024-10-08 RX ADMIN — IPRATROPIUM BROMIDE AND ALBUTEROL SULFATE 3 MILLILITER(S): .5; 3 SOLUTION RESPIRATORY (INHALATION) at 03:50

## 2024-10-08 RX ADMIN — IPRATROPIUM BROMIDE AND ALBUTEROL SULFATE 3 MILLILITER(S): .5; 3 SOLUTION RESPIRATORY (INHALATION) at 10:29

## 2024-10-08 RX ADMIN — Medication 5: at 12:39

## 2024-10-08 RX ADMIN — Medication 7 UNIT(S): at 18:18

## 2024-10-08 NOTE — SBIRT NOTE ADULT - NSSBIRTDRGBRIEFINTDET_GEN_A_CORE
SW intern met with pt at bedside. Pt reported daily cannabis use. SW intern and pt discussed reasons for use. Pt reported recreational use and shared that he uses in social setting with his friends.  SW intern and pt also discuss motivation to cease use. Pt reported that he is able to stop using when he has to, and did not express concern about his marijuana use and COPD diagnosis. Pt was offered resources for substance use treatment.  Pt. refused resources.

## 2024-10-08 NOTE — CONSULT NOTE ADULT - ASSESSMENT
The patient is a 58y Male with PMH of anxiety and asthma a/w COPD exacerbation. Endocrinology consulted for uncontrolled T2DM complicated by steroid-induced hyperglycemia.    #Uncontrolled Type 2 Diabetes Mellitus   #Steroid-induced hyperglycemia  - No Hx DM, new diagnosis, no routine medical care x15 years  - A1C with Estimated Average Glucose Result: 11.4 % (10-08-24)  - eGFR: 107 mL/min/1.73m2 (10-08-24)  - Weight (kg): 108.9 (10-07-24)  - glucose 300-400s, no evidence of DKA on labs  - steroid regimen: decadron 6mg IV x1 (10/7), now prednisone 40mg daily x4d (10/8-11)      INPATIENT PLAN:  - Recommend Lantus 22 units QHS   - Recommend Admelog 10 units TID before meals (HOLD if NPO or not eating)  - Recommend moderate dose admelog correction scale TIDQAC and separate moderate dose scale QHS  - Please check FSG before meals and QHS, or q6h while NPO  - Inpatient glucose goals: <140 pre-meal, <180 random  - consistent carb diet when eating  - nutrition consult placed  - please inform endocrine team with any planned changes in steroid regimen      DISCHARGE PLANNING:  - Discharge recs pending clinical course: will need to clarify insurance status. Ideally would DC on basal insulin (dose TBD) + GLP1RA + metformin. If GLP1RA not covered alternative would be basal/bolus insulin + metformin. Will need to check repeat lactate prior to discharge.   - Pt would also benefit from CGM if he has insurance coverage  - will need Endocrinology follow up. Please provide clinic info in [place] paperwork for patient to make an appointment: Endocrinology [facility]: [address] [place] [address]. [address] [address]. Tel: [phone]    #Hypertension  - Goal BP <130/80  - Management as per primary team  - check urine microalbumin level as outpatient    #Hyperlipidemia  - LDL goal <70  - start moderate dose statin  - check lipid panel as outpatient    Hero Sebastian MD  Endocrine Fellow  For nonurgent matters (including consults or followup questions), please email [email or web address] or [email or web address]. For urgent matters, please call answering service at [phone] (weekdays), [phone] (nights/weekends).    The patient is a 58y Male with PMH of anxiety and asthma a/w COPD exacerbation. Endocrinology consulted for uncontrolled T2DM complicated by steroid-induced hyperglycemia.    #Uncontrolled Type 2 Diabetes Mellitus   #Steroid-induced hyperglycemia  - No Hx DM, new diagnosis, no routine medical care x15 years  - A1C with Estimated Average Glucose Result: 11.4 % (10-08-24)  - eGFR: 107 mL/min/1.73m2 (10-08-24)  - Weight (kg): 108.9 (10-07-24)  - glucose 300-400s, no evidence of DKA on labs  - steroid regimen: decadron 6mg IV x1 (10/7), now prednisone 40mg daily x4d (10/8-11)      INPATIENT PLAN:  - Recommend Lantus 22 units QHS   - Recommend Admelog 10 units TID before meals (HOLD if NPO or not eating)  - Recommend moderate dose admelog correction scale TIDQAC and separate moderate dose scale QHS  - Please check FSG before meals and QHS, or q6h while NPO  - Inpatient glucose goals: <140 pre-meal, <180 random  - consistent carb diet when eating  - nutrition consult placed  - please inform endocrine team with any planned changes in steroid regimen      DISCHARGE PLANNING:  - Discharge recs pending clinical course: will need to clarify insurance status. Ideally would DC on basal insulin (dose TBD) + GLP1RA + metformin. If GLP1RA not covered alternative would be basal/bolus insulin + metformin. Will need to check repeat lactate prior to discharge.   - Pt would also benefit from CGM if he has insurance coverage  - RN insulin teaching ordered  - will need Endocrinology follow up. Please provide clinic info in [place] paperwork for patient to make an appointment: Endocrinology [facility]: [address] [place] [address]. [address]. [place] [address]. Tel: [phone]    #Hypertension  - Goal BP <130/80  - Management as per primary team  - check urine microalbumin level as outpatient    #Hyperlipidemia  - LDL goal <70  - start moderate dose statin  - check lipid panel as outpatient    Hero Sebastian MD  Endocrine Fellow  For nonurgent matters (including consults or followup questions), please email [email or web address] or [email or web address]. For urgent matters, please call answering service at [phone] (weekdays), [phone] (nights/weekends).    The patient is a 58y Male with PMH of anxiety and asthma a/w COPD exacerbation. Endocrinology consulted for uncontrolled T2DM complicated by steroid-induced hyperglycemia.    #Uncontrolled Type 2 Diabetes Mellitus   #Steroid-induced hyperglycemia  - No Hx DM, new diagnosis, no routine medical care x15 years  - A1C with Estimated Average Glucose Result: 11.4 % (10-08-24)  - eGFR: 107 mL/min/1.73m2 (10-08-24)  - Weight (kg): 108.9 (10-07-24)  - glucose 300-400s, no evidence of DKA on labs  - steroid regimen: decadron 6mg IV x1 (10/7), now prednisone 40mg daily x4d (10/8-11)      INPATIENT PLAN:  - Recommend Lantus 22 units QHS   - Recommend Admelog 10 units TID before meals (HOLD if NPO or not eating)  - Recommend moderate dose admelog correction scale TIDQAC and separate moderate dose scale QHS  - Please check FSG before meals and QHS, or q6h while NPO  - Inpatient glucose goals: <140 pre-meal, <180 random  - consistent carb diet when eating  - nutrition consult placed  - please inform endocrine team with any planned changes in steroid regimen      DISCHARGE PLANNING:  - Discharge recs pending clinical course: will need to clarify insurance status. Ideally would DC on basal insulin (dose TBD) + GLP1RA + metformin. If GLP1RA not covered alternative would be basal/bolus insulin + metformin. Will need to check repeat lactate prior to discharge.   - Pt would also benefit from CGM if he has insurance coverage  - RN insulin teaching ordered  - will need Endocrinology follow up. Please provide clinic info in [place] paperwork for patient to make an appointment: Endocrinology [facility]: [address] [place] [address]. [address] [address]. Tel: [phone]. Will wait to contact schedulers until insurance is active    #Hypertension  - Goal BP <130/80  - Management as per primary team  - check urine microalbumin level as outpatient    #Hyperlipidemia  - LDL goal <70  - start moderate dose statin  - check lipid panel as outpatient    Hero Sebastian MD  Endocrine Fellow  For nonurgent matters (including consults or followup questions), please email [email or web address] or [email or web address]. For urgent matters, please call answering service at [phone] (weekdays), [phone] (nights/weekends).

## 2024-10-08 NOTE — SBIRT NOTE ADULT - NSSBIRTSAVECONSULT_GEN_A_CORE
Referred by: Godwin Casanova MD; Medical Diagnosis (from order):    Diagnosis Information      Diagnosis    719.45 (ICD-9-CM) - M25.551, M25.552 (ICD-10-CM) - Bilateral hip pain                Physical Therapy -  Daily Treatment Note    Visit:  8     SUBJECTIVE                                                                                                             Better this week than last and gradual improvements.  Soreness noticed in the lower back in the mornings.  Patient was scrapbooking over the weekend which may have contributed to some soreness.    Functional Change: Noticing more when sitting on one foot.    Pain / Symptoms:  Patient denies pain/symptoms  Pain rating (out of 10): Current: 3     OBJECTIVE                                                                                                                       Palpation:   Right: Ilipsoas: tenderness and trigger point;         Alignment:   Supine:     Anterior Superior Iliac Spine: Right: inferior   Comments / Details: FRS Left L5  Right anterior ilium  Apparent Leg Length:   Medial Malleoli in Supine: left appears longer       TREATMENT                                                                                                                  Therapeutic Exercise:  Standing global spinal movement patterns - forward/backward, side bending, rotation (with heel pop)    Standing Hip Flexor stretch Right/Left   Manual Therapy:  Spinal and pelvic positioning assessment  Muscle Energy Technique correction of dysfunctions as noted  Supine Trigger Point Release R>L psoas      Skilled input: verbal instruction/cues, tactile instruction/cues, facilitation and posture correction  education/instruction on: transition to personal training. Used of Helix machine as warm up for personal training versus the elliptical.     Writer verbally educated and received verbal consent for hand placement, positioning of patient, and techniques to be performed today  from patient for hand placement and palpation for techniques and therapist position for techniques as described above and how they are pertinent to the patient's plan of care.    Home Exercise Program: (*above indicates provided as part of home exercise program)  11/27/19: hooklying pelvic tilt, hooklying TA activation with marching and kicks, hooklying DL bridge with marching, sidelying clamshell and reverse clamshell, sidelying hip abduction   12/11/19: modified side planks, DL squats with 4# med ball raise  12/18/19: Prone TPR psoas with ball   12/23/19: Standing Multihip - issued red TheraBand   1/8/2020: Pelvic titls to neutral + Transverse Abdominus contraction (supine, Quadruped, sitting, Standing)  1/15/2020: Standing global spinal movement patterns      ASSESSMENT                                                                                                                 Decreased \"clicking\" noted since last week.  Noticing good improvements overall and pleased with progress.    Pain/symptoms after session: 0 and 1  Patient Education:   Results of above outlined education: Verbalizes understanding and Demonstrates understanding   PLAN                                                                                                                             Suggestions for next session as indicated: Progress per plan of care  Right/Left hip rotational stabilization  Right hip rotational progressions from non weight-bearing to weight-bearing        Procedures and total treatment time documented Time Entry flowsheet.     Complete

## 2024-10-08 NOTE — CONSULT NOTE ADULT - ATTENDING COMMENTS
58M without recent medical care for years with newly diagnosed diabetes likely type 2 HbA1c 11.%, also now on prednisone for asthma/COPD.  Inpatient proceed with basal bolus insulin, DM education.  Thought to be uninsured initially but patient states he is having Purple activated.  DC on basal insulin/ GLP1/ metformin (repeat lactate).  RD consult.    Froy Rich MD  Division of Endocrinology  Pager: [phone]    If after 6PM or before 9AM, or on weekends/holidays, please call endocrine answering service for assistance ([phone]).  For nonurgent matters email [email or web address] for assistance.

## 2024-10-08 NOTE — PROGRESS NOTE ADULT - PROBLEM SELECTOR PLAN 4
The patient presented with elevated blood glucose to 338. Patient does not have a history of diabetes nor takes any antiglycemic agents. Patient denied PO intake prior to arrival to hospital, and patient is starting round of steroid treatment for COPD.    - A1c in AM  - Lipid panel in AM  - Continue to trend  - Start low-dose insulin correctional scale pre-meal and at bedtime  - Fingersticks per protocol The patient presented with elevated blood glucose to 338. Patient does not have a history of diabetes nor takes any antiglycemic agents. Patient denied PO intake prior to arrival to hospital, and patient is starting round of steroid treatment for COPD.    - A1c 11.6%  - Started on admelog 7 pre-meal with lantus 21 at bedtime  - Continue low-dose insulin correctional scale pre-meal and at bedtime  - Fingersticks per protocol  - Endocrinology consulted for discharge recs i/s/o uninsured status

## 2024-10-08 NOTE — PROVIDER CONTACT NOTE (OTHER) - ACTION/TREATMENT ORDERED:
MD notified and aware. 4U insulin given as instructed. MD states no more interventions at this time. Continue to monitor.

## 2024-10-08 NOTE — CONSULT NOTE ADULT - SUBJECTIVE AND OBJECTIVE BOX
HPI:  The patient is a 58-year-old male with past medical history of untreated hypertension, asthma, obesity, daily smoker, and anxiety who presented for evaluation of left-sided chest pain that began at 4 AM on 10/7.  The patient had been experiencing URI-like symptoms over the past week with subjective fever, chills, generalized myalgias, productive cough, nasal congestion, and rhinorrhea which the patient had been treating with Tylenol and DayQuil/NyQuil as needed. On the morning of 10/7, the patient awoke with left-sided sharp chest pain and a sensation of numbness and tingling to his toes as well as his left upper extremity. The patient prefers to avoid seeking medical care, but was heavily encouraged by his family to report to the hospital given his presenting symptoms.    ED Course  T: Afebrile, HR: 106, BP: 198/107, SpO2 98% RA    Patient stated that his pain was intermittent and associated with difficulty breathing and wheezing.  He denied any nausea, vomiting, diarrhea, abdominal pain, dysuria, hematuria, flank pain, headache, changes in vision, changes in speech, weakness, recent travel, or sick contacts.    EKG on admission consistent with sinus tach and 1st degree AV block with non-specific T-wave abnormalities. Subsequent troponin was wnl (13). The patient appeared comfortable at the bedside with diffuse inspiratory and expiratory wheezes to bilateral lung fields. The patient eventually desaturated to 89% on RA.    Patient given duonebs, magnesium sulfate, and dexamethasone. Viral panel negative. CXR within normal limits. Labs unremarkable. Patient placed on 2LNC with improvement in SpO2 to 96%-99%. Azithromycin added for COPD exacerbation, and patient admitted to medicine for continued airway monitoring and management of COPD exacerbation. (07 Oct 2024 16:40)      DIABETES HX:  Pt denies any history of DM. Has not seen a doctor in 15 years. Complains of polyuria and polydipsia x6 months. His weight "fluctuates." No FHx of DM either. A1c 11.4% this admission    PAST MEDICAL & SURGICAL HISTORY:  Hypertension      Current every day smoker      Asthma      S/P knee surgery      History of appendectomy          FAMILY HISTORY:  Family history of stroke (Mother)      MEDICATIONS  (STANDING):  albuterol/ipratropium for Nebulization 3 milliLiter(s) Nebulizer every 6 hours  amLODIPine   Tablet 5 milliGRAM(s) Oral daily  azithromycin   Tablet 500 milliGRAM(s) Oral daily  dextrose 5%. 1000 milliLiter(s) (50 mL/Hr) IV Continuous <Continuous>  dextrose 5%. 1000 milliLiter(s) (100 mL/Hr) IV Continuous <Continuous>  dextrose 50% Injectable 12.5 Gram(s) IV Push once  dextrose 50% Injectable 25 Gram(s) IV Push once  dextrose 50% Injectable 25 Gram(s) IV Push once  dextrose Oral Gel 15 Gram(s) Oral once  enoxaparin Injectable 40 milliGRAM(s) SubCutaneous every 24 hours  glucagon  Injectable 1 milliGRAM(s) IntraMuscular once  influenza   Vaccine 0.5 milliLiter(s) IntraMuscular once  insulin glargine Injectable (LANTUS) 21 Unit(s) SubCutaneous at bedtime  insulin lispro (ADMELOG) corrective regimen sliding scale   SubCutaneous three times a day before meals  insulin lispro (ADMELOG) corrective regimen sliding scale   SubCutaneous at bedtime  insulin lispro Injectable (ADMELOG) 7 Unit(s) SubCutaneous three times a day before meals  predniSONE   Tablet 40 milliGRAM(s) Oral daily  senna 2 Tablet(s) Oral at bedtime    MEDICATIONS  (PRN):      Allergies    No Known Allergies    Intolerances      Review of Systems:  Constitutional: No fever  Eyes: No blurry vision  Neuro: No tremors  HEENT: No pain  Cardiovascular: No chest pain, palpitations  Respiratory: No SOB, no cough  GI: No nausea, vomiting, abdominal pain  : No dysuria  Skin: no rash  Psych: no depression  Endocrine: no polyuria, polydipsia  Hem/lymph: no swelling  Osteoporosis: no fractures    ALL OTHER SYSTEMS REVIEWED AND NEGATIVE    PHYSICAL EXAM:  VITALS: T(C): 36.3 (10-08-24 @ 13:32)  T(F): 97.3 (10-08-24 @ 13:32), Max: 98.4 (10-07-24 @ 22:16)  HR: 89 (10-08-24 @ 16:49) (70 - 99)  BP: 147/83 (10-08-24 @ 13:32) (147/83 - 166/79)  RR:  (17 - 20)  SpO2:  (90% - 98%)  Wt(kg): --  GENERAL: NAD, well-groomed, well-developed  EYES: No proptosis, no lid lag, anicteric  HEENT:  Atraumatic, Normocephalic, moist mucous membranes  RESPIRATORY: Normal respiratory effort; no audible wheezing  SKIN: Dry, intact, No rashes or lesions  MUSCULOSKELETAL: Full range of motion, normal strength  NEURO: sensation intact, extraocular movements intact, no tremor  PSYCH: Alert and oriented x 3, normal affect, normal mood  CUSHING'S SIGNS: no striae                          15.3   12.10 )-----------( 358      ( 08 Oct 2024 06:20 )             45.0       10-08    136  |  98  |  17  ----------------------------<  284[H]  4.3   |  22  |  0.66    eGFR: 107    Ca    9.5      10-08  Mg     2.20     10-08  Phos  3.5     10-08    TPro  7.3  /  Alb  4.1  /  TBili  0.2  /  DBili  x   /  AST  14  /  ALT  22  /  AlkPhos  78  10-08      A1C with Estimated Average Glucose Result: 11.4 % (10-08-24 @ 06:20)      CAPILLARY BLOOD GLUCOSE      POCT Blood Glucose.: 273 mg/dL (08 Oct 2024 17:55)  POCT Blood Glucose.: 357 mg/dL (08 Oct 2024 12:11)  POCT Blood Glucose.: 287 mg/dL (08 Oct 2024 08:35)  POCT Blood Glucose.: 486 mg/dL (07 Oct 2024 23:04)  POCT Blood Glucose.: 488 mg/dL (07 Oct 2024 23:00)      Thyroid Function Tests:  albuterol/ipratropium for Nebulization 3 milliLiter(s) Nebulizer every 6 hours  amLODIPine   Tablet 5 milliGRAM(s) Oral daily  azithromycin   Tablet 500 milliGRAM(s) Oral daily  dextrose 5%. 1000 milliLiter(s) IV Continuous <Continuous>  dextrose 5%. 1000 milliLiter(s) IV Continuous <Continuous>  dextrose 50% Injectable 12.5 Gram(s) IV Push once  dextrose 50% Injectable 25 Gram(s) IV Push once  dextrose 50% Injectable 25 Gram(s) IV Push once  dextrose Oral Gel 15 Gram(s) Oral once  enoxaparin Injectable 40 milliGRAM(s) SubCutaneous every 24 hours  glucagon  Injectable 1 milliGRAM(s) IntraMuscular once  influenza   Vaccine 0.5 milliLiter(s) IntraMuscular once  insulin glargine Injectable (LANTUS) 21 Unit(s) SubCutaneous at bedtime  insulin lispro (ADMELOG) corrective regimen sliding scale   SubCutaneous three times a day before meals  insulin lispro (ADMELOG) corrective regimen sliding scale   SubCutaneous at bedtime  insulin lispro Injectable (ADMELOG) 7 Unit(s) SubCutaneous three times a day before meals  predniSONE   Tablet 40 milliGRAM(s) Oral daily  senna 2 Tablet(s) Oral at bedtime      10-08 Chol 269[H] Direct LDL -- LDL calculated 166[H] HDL 43 Trig 298[H]    Radiology:

## 2024-10-08 NOTE — PROGRESS NOTE ADULT - PROBLEM SELECTOR PLAN 1
The patient has a 40 pack-year smoking history and asthma and presented with acute hypoxia with SpO2 at 89%, improved following steroids, duonebs, and antibiotics initiation.    - On 2L NC, wean O2 as tolerated  - Continue duonebs q6h  - Continue prednisone 40mg PO for 5 day course  - Continue azithromycin 500mg for 3 day course  - Blood gas in AM  - RSV/Covid/Flu negative  - CXR clear    - Will require pulmonology follow-up outpatient The patient has a 40 pack-year smoking history and asthma and presented with acute hypoxia with SpO2 at 89%, improved following steroids, duonebs, and antibiotics initiation.    - On RA, pending ambulatory sat  - Continue duonebs q6h  - Continue prednisone 40mg PO for 5 day course (10/7 - 10/11)  - Continue azithromycin 500mg for 3 day course (10/7 - 10/9)  - VBG wnl  - RSV/Covid/Flu negative  - CXR clear    - Will require pulmonology follow-up outpatient

## 2024-10-08 NOTE — PROGRESS NOTE ADULT - PROBLEM SELECTOR PLAN 5
Activity: Advance as tolerated  DVT ppx: Lovenox   Diet: DASH/TLC  Dispo: Pending PT   Directive: Full Code  Electrolytes: Replete as necessary Activity: Advance as tolerated  DVT ppx: Lovenox   Diet: DASH/TLC  Dispo: Home  Directive: Full Code  Electrolytes: Replete as necessary

## 2024-10-08 NOTE — PROVIDER CONTACT NOTE (OTHER) - ASSESSMENT
ao4, no acute distress noted, patient denied SOB or respiratory distress
FS is 488 and 486, patient alert and oriented. No complaints, no acute distress noted.

## 2024-10-08 NOTE — PROGRESS NOTE ADULT - ATTENDING COMMENTS
Pt with h/o HTN, asthma, anxiety, current smoker who presented with few days of URI symptoms and new onset chest tightness, found to have hypoxia and diffuse wheezing on exam. Suspect asthma/COPD exacerbation. Also found to have hyperglycemia with A1c 11.1% c/w T2DM (new diagnosis on this admission.)   - still wheezing on my exam this morning but overall much improved; no longer requiring supplemental O2  - Flu/COVID/RSV negative  - CXR unremarkable  - C/w prednisone to complete 5 days  - 3 days azithromycin  - c/w duonebs and transition to MDI tomorrow  - outpatient pulmonary evaluation  - new dx T2DM with SIH - started weight based lantus/admelog; endo consult appreciated  - HLD - start statin  - d/c planning - SW/CM eval

## 2024-10-08 NOTE — PROVIDER CONTACT NOTE (OTHER) - BACKGROUND
58y M admitted for acute upper respiratory infection
58y M admitted for acute upper respiratory infection

## 2024-10-08 NOTE — PHARMACOTHERAPY INTERVENTION NOTE - COMMENTS
Patient educated on A1c, goal A1c, S&S hyperglycemia, hypoglycemia and treatment, healthy plate, sources of carbohydrates, goal blood glucose, basics of using a glucometer, and when to check BG, pt verbalized understanding. Will need to return to teach insulin/medications once endocrine does consult. Pt encouraged for outpt f/u with medicine and endocrine. He states he has medicaid now (will have to make sure active on discharge).  Patient educated on A1c, goal A1c, S&S hyperglycemia, hypoglycemia and treatment, healthy plate, sources of carbohydrates, goal blood glucose, basics of using a glucometer, and when to check BG, pt verbalized understanding. Will need to return to teach insulin/medications once endocrine does consult. Pt encouraged for outpt f/u with medicine and endocrine. He states he has medicaid now (will have to make sure active on discharge).      10/9/24: Patient's Medicaid is active. Counseled patient on discharge plan Lantus 24 units qHS, Trulicity 0.75 mG once a week, and metformin 500 mG BID x 1 week, then increase to 1000 mG BID with food; patient performed good return demonstration. Counseled pt on where to inject insulin and Trulicity (abdomen, outer thighs), rotating injection site to prevent lipodystrophy, proper storage, and sharps disposal. Counseled the patient on how to apply and use the Freestyle Anna 3 plus sensors/reader. Showed patient where to apply the Freestyle Anna sensor and the sensor was successfully placed on the back of his right arm. Counseled patient how often to check his blood glucose, when to check his fingerstick, when to remove the sensor (after 2 weeks of use or if he is going for an Xray/MRI/CT scan/fluoroscopy procedure, etc.), and what to do if the sensor falls off before two weeks (cannot reuse, can call the company for a replacement). Also discussed the alarm settings on the reader as well as not to take more than 500 mG of Vitamin C as this can falsely elevate his blood glucose values. Patient verbalized understanding. Reviewed goal blood glucose (BG), healthy plate, effects of prednisone on BG, and follow-up care (has appointment 10/23/24).

## 2024-10-08 NOTE — PROGRESS NOTE ADULT - SUBJECTIVE AND OBJECTIVE BOX
INTERVAL HPI/OVERNIGHT EVENTS:  Patient was seen and examined at bedside. As per nurse and patient, no o/n events, patient resting comfortably. No complaints at this time. Patient denies: fever, chills, dizziness, weakness, HA, Changes in vision, CP, palpitations, SOB, cough, N/V/D/C, dysuria, changes in bowel movements, LE edema. ROS otherwise negative.    VITAL SIGNS:  T(F): 97.4 (10-08-24 @ 05:22)  HR: 95 (10-08-24 @ 05:22)  BP: 166/79 (10-08-24 @ 05:22)  RR: 18 (10-08-24 @ 05:22)  SpO2: 96% (10-08-24 @ 05:22)  Wt(kg): --    PHYSICAL EXAM:    General: WN/WD NAD  Neurology: A&Ox3, nonfocal, SPRING x 4  Eyes: PERRLA/ EOMI, Gross vision intact  ENT/Neck: Neck supple, trachea midline, No JVD, Gross hearing intact  Respiratory: CTA B/L, No wheezing, rales, rhonchi  CV: RRR, +S1/S2, -S3/S4, no murmurs, rubs or gallops  Abdominal: Soft, NT, ND +BS, No HSM  MSK: 5/5 strength UE/LE bilaterally  Extremities: No edema, 2+ peripheral pulses  Skin: No Rashes, Hematoma, Ecchymosis  Incisions:   Tubes:    MEDICATIONS  (STANDING):  albuterol/ipratropium for Nebulization 3 milliLiter(s) Nebulizer every 6 hours  amLODIPine   Tablet 5 milliGRAM(s) Oral daily  azithromycin   Tablet 500 milliGRAM(s) Oral daily  dextrose 5%. 1000 milliLiter(s) (50 mL/Hr) IV Continuous <Continuous>  dextrose 5%. 1000 milliLiter(s) (100 mL/Hr) IV Continuous <Continuous>  dextrose 50% Injectable 12.5 Gram(s) IV Push once  dextrose 50% Injectable 25 Gram(s) IV Push once  dextrose 50% Injectable 25 Gram(s) IV Push once  dextrose Oral Gel 15 Gram(s) Oral once  enoxaparin Injectable 40 milliGRAM(s) SubCutaneous every 24 hours  glucagon  Injectable 1 milliGRAM(s) IntraMuscular once  influenza   Vaccine 0.5 milliLiter(s) IntraMuscular once  insulin glargine Injectable (LANTUS) 21 Unit(s) SubCutaneous at bedtime  insulin lispro (ADMELOG) corrective regimen sliding scale   SubCutaneous at bedtime  insulin lispro (ADMELOG) corrective regimen sliding scale   SubCutaneous three times a day before meals  insulin lispro Injectable (ADMELOG) 7 Unit(s) SubCutaneous three times a day before meals  predniSONE   Tablet 40 milliGRAM(s) Oral daily  senna 2 Tablet(s) Oral at bedtime    MEDICATIONS  (PRN):      Allergies    No Known Allergies    Intolerances        LABS:                        15.7   8.40  )-----------( 327      ( 07 Oct 2024 09:15 )             45.6     10-07    135  |  98  |  12  ----------------------------<  338[H]  4.0   |  23  |  0.62    Ca    9.2      07 Oct 2024 09:15    TPro  7.3  /  Alb  4.1  /  TBili  0.4  /  DBili  x   /  AST  18  /  ALT  18  /  AlkPhos  75  10-07      Urinalysis Basic - ( 07 Oct 2024 09:15 )    Color: x / Appearance: x / SG: x / pH: x  Gluc: 338 mg/dL / Ketone: x  / Bili: x / Urobili: x   Blood: x / Protein: x / Nitrite: x   Leuk Esterase: x / RBC: x / WBC x   Sq Epi: x / Non Sq Epi: x / Bacteria: x        RADIOLOGY & ADDITIONAL TESTS:  Reviewed INTERVAL HPI/OVERNIGHT EVENTS:  Patient was seen and examined at bedside. Overnight, patient glucose elevated to >400. Patient resting comfortably. No complaints at this time. Patient denies: fever, chills, dizziness, weakness, HA, Changes in vision, CP, palpitations, SOB, cough, N/V/D/C, dysuria, changes in bowel movements, LE edema. ROS otherwise negative.    VITAL SIGNS:  T(F): 97.4 (10-08-24 @ 05:22)  HR: 95 (10-08-24 @ 05:22)  BP: 166/79 (10-08-24 @ 05:22)  RR: 18 (10-08-24 @ 05:22)  SpO2: 96% (10-08-24 @ 05:22)  Wt(kg): --    PHYSICAL EXAM:    General: WN/WD NAD  Neurology: A&Ox3, nonfocal, SPRING x 4  Eyes: PERRLA/ EOMI, Gross vision intact  ENT/Neck: Neck supple, trachea midline, No JVD, Gross hearing intact  Respiratory: Trace wheezing bilaterally  CV: RRR, +S1/S2, -S3/S4, no murmurs, rubs or gallops  Abdominal: Soft, NT, ND +BS, No HSM  Extremities: No edema, 2+ peripheral pulses  Skin: No Rashes, Hematoma, Ecchymosis      MEDICATIONS  (STANDING):  albuterol/ipratropium for Nebulization 3 milliLiter(s) Nebulizer every 6 hours  amLODIPine   Tablet 5 milliGRAM(s) Oral daily  azithromycin   Tablet 500 milliGRAM(s) Oral daily  dextrose 5%. 1000 milliLiter(s) (50 mL/Hr) IV Continuous <Continuous>  dextrose 5%. 1000 milliLiter(s) (100 mL/Hr) IV Continuous <Continuous>  dextrose 50% Injectable 12.5 Gram(s) IV Push once  dextrose 50% Injectable 25 Gram(s) IV Push once  dextrose 50% Injectable 25 Gram(s) IV Push once  dextrose Oral Gel 15 Gram(s) Oral once  enoxaparin Injectable 40 milliGRAM(s) SubCutaneous every 24 hours  glucagon  Injectable 1 milliGRAM(s) IntraMuscular once  influenza   Vaccine 0.5 milliLiter(s) IntraMuscular once  insulin glargine Injectable (LANTUS) 21 Unit(s) SubCutaneous at bedtime  insulin lispro (ADMELOG) corrective regimen sliding scale   SubCutaneous at bedtime  insulin lispro (ADMELOG) corrective regimen sliding scale   SubCutaneous three times a day before meals  insulin lispro Injectable (ADMELOG) 7 Unit(s) SubCutaneous three times a day before meals  predniSONE   Tablet 40 milliGRAM(s) Oral daily  senna 2 Tablet(s) Oral at bedtime    MEDICATIONS  (PRN):      Allergies    No Known Allergies    Intolerances        LABS:                        15.7   8.40  )-----------( 327      ( 07 Oct 2024 09:15 )             45.6     10-07    135  |  98  |  12  ----------------------------<  338[H]  4.0   |  23  |  0.62    Ca    9.2      07 Oct 2024 09:15    TPro  7.3  /  Alb  4.1  /  TBili  0.4  /  DBili  x   /  AST  18  /  ALT  18  /  AlkPhos  75  10-07      Urinalysis Basic - ( 07 Oct 2024 09:15 )    Color: x / Appearance: x / SG: x / pH: x  Gluc: 338 mg/dL / Ketone: x  / Bili: x / Urobili: x   Blood: x / Protein: x / Nitrite: x   Leuk Esterase: x / RBC: x / WBC x   Sq Epi: x / Non Sq Epi: x / Bacteria: x        RADIOLOGY & ADDITIONAL TESTS:  Reviewed

## 2024-10-08 NOTE — PROGRESS NOTE ADULT - PROBLEM SELECTOR PLAN 2
Patient has history of hypertension which he manages off medication outpatient. Systolic blood pressure fluctuates between 130s-190s/80s-100s. Patient endorsed history of anxiety prior to admission, also managed off of medication.    - Continue to monitor  - Consider initiation of low dose ARB if patient amenable Patient has history of hypertension which he manages off medication outpatient. Systolic blood pressure fluctuates between 130s-190s/80s-100s. Patient endorsed history of anxiety prior to admission, also managed off of medication.    - Started on 5mg amlodipine, can up-titrate outpatient  - Continue to monitor

## 2024-10-09 ENCOUNTER — TRANSCRIPTION ENCOUNTER (OUTPATIENT)
Age: 59
End: 2024-10-09

## 2024-10-09 VITALS — WEIGHT: 240.08 LBS

## 2024-10-09 LAB
ALBUMIN SERPL ELPH-MCNC: 4.1 G/DL — SIGNIFICANT CHANGE UP (ref 3.3–5)
ALP SERPL-CCNC: 66 U/L — SIGNIFICANT CHANGE UP (ref 40–120)
ALT FLD-CCNC: 23 U/L — SIGNIFICANT CHANGE UP (ref 4–41)
ANION GAP SERPL CALC-SCNC: 12 MMOL/L — SIGNIFICANT CHANGE UP (ref 7–14)
AST SERPL-CCNC: 22 U/L — SIGNIFICANT CHANGE UP (ref 4–40)
BASOPHILS # BLD AUTO: 0.06 K/UL — SIGNIFICANT CHANGE UP (ref 0–0.2)
BASOPHILS NFR BLD AUTO: 0.4 % — SIGNIFICANT CHANGE UP (ref 0–2)
BILIRUB SERPL-MCNC: 0.4 MG/DL — SIGNIFICANT CHANGE UP (ref 0.2–1.2)
BUN SERPL-MCNC: 24 MG/DL — HIGH (ref 7–23)
CALCIUM SERPL-MCNC: 9 MG/DL — SIGNIFICANT CHANGE UP (ref 8.4–10.5)
CHLORIDE SERPL-SCNC: 100 MMOL/L — SIGNIFICANT CHANGE UP (ref 98–107)
CO2 SERPL-SCNC: 24 MMOL/L — SIGNIFICANT CHANGE UP (ref 22–31)
CREAT SERPL-MCNC: 0.72 MG/DL — SIGNIFICANT CHANGE UP (ref 0.5–1.3)
EGFR: 105 ML/MIN/1.73M2 — SIGNIFICANT CHANGE UP
EOSINOPHIL # BLD AUTO: 0.13 K/UL — SIGNIFICANT CHANGE UP (ref 0–0.5)
EOSINOPHIL NFR BLD AUTO: 1 % — SIGNIFICANT CHANGE UP (ref 0–6)
GLUCOSE BLDC GLUCOMTR-MCNC: 235 MG/DL — HIGH (ref 70–99)
GLUCOSE BLDC GLUCOMTR-MCNC: 244 MG/DL — HIGH (ref 70–99)
GLUCOSE SERPL-MCNC: 203 MG/DL — HIGH (ref 70–99)
HCT VFR BLD CALC: 43.3 % — SIGNIFICANT CHANGE UP (ref 39–50)
HGB BLD-MCNC: 15.1 G/DL — SIGNIFICANT CHANGE UP (ref 13–17)
IANC: 7.59 K/UL — HIGH (ref 1.8–7.4)
IMM GRANULOCYTES NFR BLD AUTO: 0.3 % — SIGNIFICANT CHANGE UP (ref 0–0.9)
LYMPHOCYTES # BLD AUTO: 34.4 % — SIGNIFICANT CHANGE UP (ref 13–44)
LYMPHOCYTES # BLD AUTO: 4.67 K/UL — HIGH (ref 1–3.3)
MAGNESIUM SERPL-MCNC: 2.1 MG/DL — SIGNIFICANT CHANGE UP (ref 1.6–2.6)
MCHC RBC-ENTMCNC: 30.6 PG — SIGNIFICANT CHANGE UP (ref 27–34)
MCHC RBC-ENTMCNC: 34.9 GM/DL — SIGNIFICANT CHANGE UP (ref 32–36)
MCV RBC AUTO: 87.7 FL — SIGNIFICANT CHANGE UP (ref 80–100)
MONOCYTES # BLD AUTO: 1.08 K/UL — HIGH (ref 0–0.9)
MONOCYTES NFR BLD AUTO: 8 % — SIGNIFICANT CHANGE UP (ref 2–14)
NEUTROPHILS # BLD AUTO: 7.59 K/UL — HIGH (ref 1.8–7.4)
NEUTROPHILS NFR BLD AUTO: 55.9 % — SIGNIFICANT CHANGE UP (ref 43–77)
NRBC # BLD: 0 /100 WBCS — SIGNIFICANT CHANGE UP (ref 0–0)
NRBC # FLD: 0 K/UL — SIGNIFICANT CHANGE UP (ref 0–0)
PHOSPHATE SERPL-MCNC: 3.2 MG/DL — SIGNIFICANT CHANGE UP (ref 2.5–4.5)
PLATELET # BLD AUTO: 350 K/UL — SIGNIFICANT CHANGE UP (ref 150–400)
POTASSIUM SERPL-MCNC: 3.6 MMOL/L — SIGNIFICANT CHANGE UP (ref 3.5–5.3)
POTASSIUM SERPL-SCNC: 3.6 MMOL/L — SIGNIFICANT CHANGE UP (ref 3.5–5.3)
PROT SERPL-MCNC: 6.8 G/DL — SIGNIFICANT CHANGE UP (ref 6–8.3)
RBC # BLD: 4.94 M/UL — SIGNIFICANT CHANGE UP (ref 4.2–5.8)
RBC # FLD: 12.7 % — SIGNIFICANT CHANGE UP (ref 10.3–14.5)
SODIUM SERPL-SCNC: 136 MMOL/L — SIGNIFICANT CHANGE UP (ref 135–145)
WBC # BLD: 13.57 K/UL — HIGH (ref 3.8–10.5)
WBC # FLD AUTO: 13.57 K/UL — HIGH (ref 3.8–10.5)

## 2024-10-09 PROCEDURE — 99239 HOSP IP/OBS DSCHRG MGMT >30: CPT | Mod: GC

## 2024-10-09 PROCEDURE — 99232 SBSQ HOSP IP/OBS MODERATE 35: CPT

## 2024-10-09 RX ORDER — DULAGLUTIDE 4.5 MG/.5ML
0.75 INJECTION, SOLUTION SUBCUTANEOUS
Qty: 3 | Refills: 0
Start: 2024-10-09

## 2024-10-09 RX ORDER — LOSARTAN POTASSIUM 100 MG/1
1 TABLET, FILM COATED ORAL
Qty: 30 | Refills: 0
Start: 2024-10-09 | End: 2024-11-07

## 2024-10-09 RX ORDER — ATORVASTATIN CALCIUM 10 MG/1
20 TABLET, FILM COATED ORAL AT BEDTIME
Refills: 0 | Status: DISCONTINUED | OUTPATIENT
Start: 2024-10-09 | End: 2024-10-09

## 2024-10-09 RX ORDER — INSULIN GLARGINE 300 U/ML
24 INJECTION, SOLUTION SUBCUTANEOUS
Qty: 5 | Refills: 0
Start: 2024-10-09 | End: 2024-11-07

## 2024-10-09 RX ORDER — INSULIN LISPRO 100/ML
13 VIAL (ML) SUBCUTANEOUS
Refills: 0 | Status: DISCONTINUED | OUTPATIENT
Start: 2024-10-09 | End: 2024-10-09

## 2024-10-09 RX ORDER — ALBUTEROL 90 MCG
2 AEROSOL (GRAM) INHALATION
Qty: 1 | Refills: 0
Start: 2024-10-09 | End: 2024-11-07

## 2024-10-09 RX ORDER — LOSARTAN POTASSIUM 100 MG/1
25 TABLET, FILM COATED ORAL DAILY
Refills: 0 | Status: DISCONTINUED | OUTPATIENT
Start: 2024-10-09 | End: 2024-10-09

## 2024-10-09 RX ORDER — DULAGLUTIDE 4.5 MG/.5ML
0.75 INJECTION, SOLUTION SUBCUTANEOUS
Qty: 3 | Refills: 0 | DISCHARGE
Start: 2024-10-09

## 2024-10-09 RX ORDER — ATORVASTATIN CALCIUM 10 MG/1
1 TABLET, FILM COATED ORAL
Qty: 30 | Refills: 0
Start: 2024-10-09 | End: 2024-11-07

## 2024-10-09 RX ORDER — BENZOCAINE .06; .7 ML/1; ML/1
0 SWAB TOPICAL
Qty: 100 | Refills: 1
Start: 2024-10-09

## 2024-10-09 RX ORDER — INSULIN DETEMIR 100 [IU]/ML
24 INJECTION, SOLUTION SUBCUTANEOUS
Qty: 5 | Refills: 0
Start: 2024-10-09 | End: 2024-11-07

## 2024-10-09 RX ORDER — METFORMIN HCL 500 MG
1 TABLET ORAL
Qty: 60 | Refills: 0
Start: 2024-10-09

## 2024-10-09 RX ORDER — INSULIN GLARGINE 300 U/ML
24 INJECTION, SOLUTION SUBCUTANEOUS AT BEDTIME
Refills: 0 | Status: DISCONTINUED | OUTPATIENT
Start: 2024-10-09 | End: 2024-10-09

## 2024-10-09 RX ADMIN — Medication 10 UNIT(S): at 12:46

## 2024-10-09 RX ADMIN — Medication 4: at 08:40

## 2024-10-09 RX ADMIN — AZITHROMYCIN 500 MILLIGRAM(S): 250 TABLET, FILM COATED ORAL at 12:42

## 2024-10-09 RX ADMIN — Medication 10 UNIT(S): at 08:41

## 2024-10-09 RX ADMIN — PREDNISONE 40 MILLIGRAM(S): 5 TABLET ORAL at 05:23

## 2024-10-09 RX ADMIN — Medication 4: at 12:45

## 2024-10-09 RX ADMIN — Medication 5 MILLIGRAM(S): at 05:23

## 2024-10-09 NOTE — PROGRESS NOTE ADULT - ASSESSMENT
The patient is a 58y Male with PMH of anxiety and asthma a/w COPD exacerbation. Endocrinology consulted for uncontrolled T2DM complicated by steroid-induced hyperglycemia.    Uncontrolled Type 2 Diabetes Mellitus   Steroid-induced hyperglycemia  - No Hx DM, new diagnosis, no routine medical care x15 years  - A1C with Estimated Average Glucose Result: 11.4 % (10-08-24)      Inpatient plan   - FS goal 100-180 mg/dl   - FS above goal  - steroid regimen: decadron 6mg IV x1 (10/7), now prednisone 40mg daily x4d (10/8-11)  - increase Lantus to 24 units at bedtime  - increase Admelog to 13 units TID AC. Hold if not eating/NPO.   - continue moderate Admelog correctional scale TID AC  - continue separate moderate Admelog correctional scale at HS   - FS TID AC & HS ---> q6 if NPO   - hypoglycemia protocol PRN   - consistent carbohydrate diet  - RD consult  - provider to RN - insulin pen teaching         Discharge plan   - Patient motivated to change his diet.  - Discharge on: Trulicity 0.75 mg subQ weekly + Lantus pen (or equivalent basal insulin) 24 units at bedtime + metformin 500 mg PO BID for 7 days then increase to 1000 mg PO BID.   - please send prescription for glucometer and supplies - test strip, lancets, alcohol pads and insulin pen needles.  - Please send prescription for CGM Freestyle kurt 3 sensors (quantity: 2 sensors). Change every 14 days.   - please send prescription for Glucose tablets 4G (take 4 tablets) or 15G tablets for blood sugar less than 70 mG/dL, repeat fingerstick in 15 minutes.  - Follow up at Endocrine Practice at [facility]: [address] [place] [address]; Ph # [phone]           Hypertension  - Goal BP <130/80  - continue losartan   - Management as per primary team  - check urine microalbumin level as outpatient        Hyperlipidemia  - LDL goal <70  -   - continue statin   - management per primary team         ROSAURA Dean-BC  Nurse Practitioner  Division of Endocrinology & Diabetes  pager [phone]  The patient is a 58y Male with PMH of anxiety and asthma a/w COPD exacerbation. Endocrinology consulted for uncontrolled T2DM complicated by steroid-induced hyperglycemia.    Uncontrolled Type 2 Diabetes Mellitus   Steroid-induced hyperglycemia  - No Hx DM, new diagnosis, no routine medical care x15 years  - A1C with Estimated Average Glucose Result: 11.4 % (10-08-24)      Inpatient plan   - FS goal 100-180 mg/dl   - FS above goal  - steroid regimen: decadron 6mg IV x1 (10/7), now prednisone 40mg daily x4d (10/8-11)  - increase Lantus to 24 units at bedtime  - increase Admelog to 13 units TID AC. Hold if not eating/NPO.   - continue moderate Admelog correctional scale TID AC  - continue separate moderate Admelog correctional scale at HS   - FS TID AC & HS ---> q6 if NPO   - hypoglycemia protocol PRN   - consistent carbohydrate diet  - RD consult  - provider to RN - insulin pen teaching         Discharge plan   - Patient motivated to change his diet.  - Discharge on: Trulicity 0.75 mg subQ weekly + Lantus pen (or equivalent basal insulin) 24 units at bedtime + metformin 500 mg PO BID for 7 days then increase to 1000 mg PO BID.   - lactate 2.9 ---> improved to 2.1, this could likely be due to patient receiving nebs. Repeat lactate as outpatient.   - please send prescription for glucometer and supplies - test strip, lancets, alcohol pads and insulin pen needles.  - Please send prescription for CGM Freestyle kurt 3 sensors (quantity: 2 sensors). Change every 14 days.   - please send prescription for Glucose tablets 4G (take 4 tablets) or 15G tablets for blood sugar less than 70 mG/dL, repeat fingerstick in 15 minutes.  - Follow up at Endocrine Practice at [facility] [place]: [address] [place] [address]; Ph # [phone]           Hypertension  - Goal BP <130/80  - continue losartan   - Management as per primary team  - check urine microalbumin level as outpatient        Hyperlipidemia  - LDL goal <70  -   - continue statin   - management per primary team         ROSAURA Dean-BC  Nurse Practitioner  Division of Endocrinology & Diabetes  pager [phone]

## 2024-10-09 NOTE — DISCHARGE NOTE PROVIDER - NSDCFUADDAPPT_GEN_ALL_CORE_FT
APPTS ARE READY TO BE MADE: [] YES    Best Family or Patient Contact (if needed):    Additional Information about above appointments (if needed):    1:   2:   3:     Other comments or requests:    APPTS ARE READY TO BE MADE: [] YES    Best Family or Patient Contact (if needed):    Additional Information about above appointments (if needed):    1: Internal Medicine - [facility] (10/23/24)  2: Pulmonary HOME Program - 1-3 days  3:     Other comments or requests:

## 2024-10-09 NOTE — DISCHARGE NOTE PROVIDER - HOSPITAL COURSE
HPI:  The patient is a 58-year-old male with past medical history of untreated hypertension, asthma, obesity, daily smoker, and anxiety who presented for evaluation of left-sided chest pain that began at 4 AM on 10/7.  The patient had been experiencing URI-like symptoms over the past week with subjective fever, chills, generalized myalgias, productive cough, nasal congestion, and rhinorrhea which the patient had been treating with Tylenol and DayQuil/NyQuil as needed. On the morning of 10/7, the patient awoke with left-sided sharp chest pain and a sensation of numbness and tingling to his toes as well as his left upper extremity. The patient prefers to avoid seeking medical care, but was heavily encouraged by his family to report to the hospital given his presenting symptoms.    ED Course  T: Afebrile, HR: 106, BP: 198/107, SpO2 98% RA    Patient stated that his pain was intermittent and associated with difficulty breathing and wheezing.  He denied any nausea, vomiting, diarrhea, abdominal pain, dysuria, hematuria, flank pain, headache, changes in vision, changes in speech, weakness, recent travel, or sick contacts.    EKG on admission consistent with sinus tach and 1st degree AV block with non-specific T-wave abnormalities. Subsequent troponin was wnl (13). The patient appeared comfortable at the bedside with diffuse inspiratory and expiratory wheezes to bilateral lung fields. The patient eventually desaturated to 89% on RA.    Patient given duonebs, magnesium sulfate, and dexamethasone. Viral panel negative. CXR within normal limits. Labs unremarkable. Patient placed on 2LNC with improvement in SpO2 to 96%-99%. Azithromycin added for COPD exacerbation, and patient admitted to medicine for continued airway monitoring and management of COPD exacerbation. Patient tolerated COPD treatment well and was able to be weaned off of supplemental O2. Patient was found to be hyperglycemic >400 on admission and subsequent A1c was consistent with diabetes. Patient was initiated on insulin inpatient and endocrinology consulted for outpatient regimen recommendations. Patient was amenable to discharge with close follow-up with internal medicine, pulmonology, and endocrinology. The patient did not seek regular medical care prior to hospitalization and was amenable to seeing [facility] services.        Hospital Course:    Important Medication Changes and Reason:    Active or Pending Issues Requiring Follow-up:    Advanced Directives:   [ ] Full code  [ ] DNR  [ ] Hospice    Discharge Diagnoses:           HPI:  The patient is a 58-year-old male with past medical history of untreated hypertension, asthma, obesity, daily smoker, and anxiety who presented for evaluation of left-sided chest pain that began at 4 AM on 10/7.  The patient had been experiencing URI-like symptoms over the past week with subjective fever, chills, generalized myalgias, productive cough, nasal congestion, and rhinorrhea which the patient had been treating with Tylenol and DayQuil/NyQuil as needed. On the morning of 10/7, the patient awoke with left-sided sharp chest pain and a sensation of numbness and tingling to his toes as well as his left upper extremity. The patient prefers to avoid seeking medical care, but was heavily encouraged by his family to report to the hospital given his presenting symptoms.    ED Course  T: Afebrile, HR: 106, BP: 198/107, SpO2 98% RA    Patient stated that his pain was intermittent and associated with difficulty breathing and wheezing.  He denied any nausea, vomiting, diarrhea, abdominal pain, dysuria, hematuria, flank pain, headache, changes in vision, changes in speech, weakness, recent travel, or sick contacts.    EKG on admission consistent with sinus tach and 1st degree AV block with non-specific T-wave abnormalities. Subsequent troponin was wnl (13). The patient appeared comfortable at the bedside with diffuse inspiratory and expiratory wheezes to bilateral lung fields. The patient eventually desaturated to 89% on RA.    Patient given duonebs, magnesium sulfate, and dexamethasone. Viral panel negative. CXR within normal limits. Labs unremarkable. Patient placed on 2LNC with improvement in SpO2 to 96%-99%. Azithromycin added for COPD exacerbation, and patient admitted to medicine for continued airway monitoring and management of COPD exacerbation. Patient tolerated COPD treatment.      Hospital Course:  Patient was weaned off of supplemental O2 and tolerated steroid, duoneb, and antibiotic regimen. Patient was found to be hyperglycemic >400 on admission and subsequent A1c was consistent with diabetes. Patient was initiated on insulin inpatient and endocrinology consulted for outpatient regimen recommendations. Patient was amenable to discharge with close follow-up with internal medicine and pulmonology. The patient did not seek regular medical care prior to hospitalization due to loss of insurance, patient was able to obtain medicaid coverage during hospitalization and appointments were put in place with [facility] internal medicine and pulmonology. Patient was stable on day of discharge.      Important Medication Changes and Reason:  - START atorvastatin 20mg PO qd  - START losartan 25mg PO qd  - START Lantus 24 units subq at bedtime  - START metformin 500mg PO BID for 7 days, increase to 1000mg BID on day 8  - START Trulicity 0.75mg subq weekly  - START albuterol inhaler PRN      Active or Pending Issues Requiring Follow-up:  - Close follow-up with Pulmonary HOME program to optimize pulmonary treatment and formal testing for asthma vs COPD  - Optimization of HTN, HLD, and diabetes regimen with primary care via PCP through [facility]      Advanced Directives:   [x] Full code  [ ] DNR  [ ] Hospice    Discharge Diagnoses:  COPD Exacerbation  Diabetes Mellitus Type 2  HTN, HLD

## 2024-10-09 NOTE — PROGRESS NOTE ADULT - PROBLEM SELECTOR PLAN 5
Activity: Advance as tolerated  DVT ppx: Lovenox   Diet: DASH/TLC  Dispo: Home  Directive: Full Code  Electrolytes: Replete as necessary Activity: Advance as tolerated  DVT ppx: Lovenox   Diet: DASH/TLC, CC  Dispo: Home  Directive: Full Code  Electrolytes: Replete as necessary

## 2024-10-09 NOTE — DIETITIAN INITIAL EVALUATION ADULT - PROBLEM SELECTOR PLAN 3
The patient has a history of asthma which he manages at home with doses of over the counter ephedrine 1-2 times/week.    - O2 management as above  - Airway management as above  - Pulmonology follow-up outpatient

## 2024-10-09 NOTE — DIETITIAN INITIAL EVALUATION ADULT - ADD RECOMMEND
- Continue diet as ordered  - Monitor PO intake, tolerance to diet/supplement, nutrition related lab values, weight trends, BMs/GI distress, hydration status, skin integrity

## 2024-10-09 NOTE — PROGRESS NOTE ADULT - PROBLEM SELECTOR PLAN 4
The patient presented with elevated blood glucose to 338. Patient does not have a history of diabetes nor takes any antiglycemic agents. Patient denied PO intake prior to arrival to hospital, and patient is starting round of steroid treatment for COPD.    - A1c 11.6%  - Started on admelog 7 pre-meal with lantus 21 at bedtime  - Continue low-dose insulin correctional scale pre-meal and at bedtime  - Fingersticks per protocol  - Endocrinology consulted for discharge recs i/s/o uninsured status The patient presented with elevated blood glucose to 338. Patient does not have a history of diabetes nor takes any antiglycemic agents. Patient denied PO intake prior to arrival to hospital, and patient is starting round of steroid treatment for COPD.    - A1c 11.6%  - Endocrinology following  - Increased admelog to 10 units pre-meal   - Increased lantus to 22 units at bedtime  - Increased to moderate-dose insulin correctional scale pre-meal and at bedtime  - Fingersticks per protocol    - Pending final endocrinology discharge recs

## 2024-10-09 NOTE — DIETITIAN INITIAL EVALUATION ADULT - ORAL INTAKE PTA/DIET HISTORY
Patient seen for assessment. Reports good appetite at home. States consuming more fast food, sweets and high fat foods over the past 4 months due to his dog passing, which is normally not how he eats. Normally he skips breakfast, has grilled chicken salad for lunch and dinner is pasta w/ chicken or meat w/ rice. A1c is 11.4% w/ new diagnosis of DM per chart.

## 2024-10-09 NOTE — DISCHARGE NOTE PROVIDER - NSDCCPTREATMENT_GEN_ALL_CORE_FT
PRINCIPAL PROCEDURE  Procedure: XR chest, 2 views  Findings and Treatment: Two views of the chest without comparison.  FINDINGS: Normal cardiac silhouette and normal pulmonary vasculature with   no consolidation, effusion, pneumothorax or acute osseous findings.  IMPRESSION:  Negative radiographs

## 2024-10-09 NOTE — DIETITIAN INITIAL EVALUATION ADULT - NS FNS DIET ORDER
Diet, Regular:   Consistent Carbohydrate {No Snacks} (CSTCHO)  DASH/TLC {Sodium & Cholesterol Restricted} (DASH) (10-08-24 @ 18:23)

## 2024-10-09 NOTE — DISCHARGE NOTE PROVIDER - NSFOLLOWUPCLINICS_GEN_ALL_ED_FT
[facility] Endocrinology  Endocrinology  [address] [place] [address]  Phone: [phone]  Fax:   Follow Up Time: 2 weeks    [facility] Pulmonolgy and Sleep Medicine  Pulmonology  [address] [place] [address]  Phone: [phone]  Fax:   Follow Up Time: 2 weeks    [facility] Medicine Specialties at [place]  Internal Medicine  [facility] [place] [address]  Phone: [phone]  Fax: [phone]  Follow Up Time: 2 weeks     [facility] Endocrinology  Endocrinology  [address] [place] [address]  Phone: [phone]  Fax:   Follow Up Time: 2 weeks    [facility]ties at [place]  Internal Medicine  [address] [place] [address]  Phone: [phone]  Fax: [phone]  Follow Up Time: 2 weeks

## 2024-10-09 NOTE — DIETITIAN INITIAL EVALUATION ADULT - OTHER INFO
58 year old male with past medical history of untreated hypertension, COPD, and anxiety presenting with acute hypoxia secondary to likely COPD exacerbation per chart.    Patient w/ good appetite in house. No GI distress reported. Has no food allergies. Reports weight at one point was 260 lbs. and has been trying to lose weight. ABW is 240 lbs. (10/7) per chart indicating a -7.7% weight loss. No edema or pressure injuries noted per RN flow sheet.     Provided pt with handout Carbohydrate Counting for People and [employer] Diabetes. Discussed carbohydrate sources, carbohydrate portions, protein sources, mixed meals, and nutrition label reading. Stressed importance of balanced meals with adequate protein and fiber. Pt was informed of current A1c, goal A1c, and goal fingerstick range.

## 2024-10-09 NOTE — PROGRESS NOTE ADULT - PROBLEM SELECTOR PLAN 3
The patient has a history of asthma which he manages at home with doses of over the counter ephedrine 1-2 times/week.    - O2 management as above  - Airway management as above  - Pulmonology follow-up outpatient
The patient has a history of asthma which he manages at home with doses of over the counter ephedrine 1-2 times/week.    - O2 management as above  - Airway management as above  - Pulmonology follow-up outpatient

## 2024-10-09 NOTE — DISCHARGE NOTE PROVIDER - NSFOLLOWUPCLINICSTOKEN_GEN_ALL_ED_FT
179048:2 weeks|| ||00\01||False;310646:2 weeks|| ||00\01||False;715833:2 weeks|| ||00\01||False; 401610:2 weeks|| ||00\01||False;552074:2 weeks||10/23/2024||00\01||False;

## 2024-10-09 NOTE — PROGRESS NOTE ADULT - PROBLEM SELECTOR PLAN 2
Patient has history of hypertension which he manages off medication outpatient. Systolic blood pressure fluctuates between 130s-190s/80s-100s. Patient endorsed history of anxiety prior to admission, also managed off of medication.    - Started on 5mg amlodipine, can up-titrate outpatient  - Continue to monitor Patient has history of hypertension which he manages off medication outpatient. Systolic blood pressure fluctuates between 130s-190s/80s-100s. Patient endorsed history of anxiety prior to admission, also managed off of medication.    - Stopped amlodipine (10/9)  - Transitioned to losartan 25mg qd i/s/o recent diabetes diagnosis  - Continue to monitor

## 2024-10-09 NOTE — DIETITIAN INITIAL EVALUATION ADULT - PROBLEM SELECTOR PLAN 4
The patient presented with elevated blood glucose to 338. Patient does not have a history of diabetes nor takes any antiglycemic agents. Patient denied PO intake prior to arrival to hospital, and patient is starting round of steroid treatment for COPD.    - A1c in AM  - Lipid panel in AM  - Continue to trend  - Start low-dose insulin correctional scale pre-meal and at bedtime  - Fingersticks per protocol

## 2024-10-09 NOTE — DISCHARGE NOTE PROVIDER - CARE PROVIDER_API CALL
Bonnie Burr  Pulmonary Disease  [address] [place] [id number]  Phone: [phone]  Fax: [phone]  Follow Up Time: 1-3 days

## 2024-10-09 NOTE — PROGRESS NOTE ADULT - PROBLEM SELECTOR PLAN 1
The patient has a 40 pack-year smoking history and asthma and presented with acute hypoxia with SpO2 at 89%, improved following steroids, duonebs, and antibiotics initiation.    - On RA, pending ambulatory sat  - Continue duonebs q6h  - Continue prednisone 40mg PO for 5 day course (10/7 - 10/11)  - Continue azithromycin 500mg for 3 day course (10/7 - 10/9)  - VBG wnl  - RSV/Covid/Flu negative  - CXR clear    - Will require pulmonology follow-up outpatient The patient has a 40 pack-year smoking history and asthma and presented with acute hypoxia with SpO2 at 89%, improved following steroids, duonebs, and antibiotics initiation.    - On RA, ambulatory sat 90%  - Continue albuterol q6h PRN  - Continue duonebs q6h  - Continue prednisone 40mg PO for 5 day course (10/7 - 10/11)  - Continue azithromycin 500mg for 3 day course (10/7 - 10/9)  - VBG wnl  - RSV/Covid/Flu negative  - CXR clear    - Will require pulmonology follow-up outpatient

## 2024-10-09 NOTE — DISCHARGE NOTE PROVIDER - NSDCCPCAREPLAN_GEN_ALL_CORE_FT
PRINCIPAL DISCHARGE DIAGNOSIS  Diagnosis: COPD exacerbation  Assessment and Plan of Treatment: You were admitted to the hosptial for concern over a disease called chronic obstructive pulmonary disease (COPD). COPD is a lung condition in which airflow from the lungs is limited. Causes include smoking, secondhand smoke exposure, genetics, or recurrent infections. Take all medicines (inhaled or pills) as directed by your health care provider. Avoid exposure to irritants such as smoke, chemicals, and fumes that aggravate your breathing.  You were given steroids, breathing treatments, and antibiotics in order to treat your COPD, after which your breathing improved. Your breathing remained stable in the hospital and you did not require additional oxygen support once you left the hospital.  You have been scheduled for an appointment with a [facility] Pulmonologist within 1-3 days from your discharge. They will contact you with options very soon to have the appointment scheduled. If you are unable to make this appointment, please contact the number for Dr. Burr provided in your discharge paperwork to set up an appointment.  If you are a smoker, the most important thing that you can do to manage your COPD is to stop smoking. Continuing to smoke will cause further lung damage and breathing trouble. Ask your health care provider for help with quitting smoking.  SEEK IMMEDIATE MEDICAL CARE IF YOU HAVE ANY OF THE FOLLOWING SYMPTOMS: shortness of breath at rest or when talking, bluish discoloration of lips, skin, fever, worsening cough, unexplained chest pain, or lightheadedness/dizziness.      SECONDARY DISCHARGE DIAGNOSES  Diagnosis: T2DM (type 2 diabetes mellitus)  Assessment and Plan of Treatment: During your admission, you blood sugar was found to be very high, and you were subsequently diagnosed with diabetes. Diabetes mellitus, or diabetes, is a long-term (chronic) disease. It occurs when the body does not properly use sugar (glucose) that is released from food after you eat. Insulin lets glucose enter cells in your body. This gives you energy. If you have diabetes, glucose cannot get into cells. This causes high blood glucose (hyperglycemia).  You were started on insulin in the hospital and followed by the diabetes specialists in the hospital. You have been discharged on a combination of medications meant to keep your blood sugar under control.  Please take your medications as described to ensure that your diabetes is managed well.  You have been scheduled for an appointment with the [facility] Primary Care team on October 23rd. Please make every effort to attend this appointment. Your doctor will be able to help optimize your diabetes regimen from there and manage your medication refills.    Diagnosis: Acute asthma exacerbation  Assessment and Plan of Treatment:     Diagnosis: Hypertension  Assessment and Plan of Treatment: You have a history of high blood pressure. While in the hospital you were found to have persistently high blood pressure and were started on a blood pressure medication, losartan. This medication is especially helpful for patients with high blood pressure who also have diabetes.  Please continue to take your losartan and follow-up with your primary care appointment on 10/23 to further control your blood pressure.

## 2024-10-09 NOTE — PROGRESS NOTE ADULT - SUBJECTIVE AND OBJECTIVE BOX
INTERVAL HPI/OVERNIGHT EVENTS:  Patient was seen and examined at bedside. As per nurse and patient, no o/n events, patient resting comfortably. No complaints at this time. Patient denies: fever, chills, dizziness, weakness, HA, Changes in vision, CP, palpitations, SOB, cough, N/V/D/C, dysuria, changes in bowel movements, LE edema. ROS otherwise negative.    VITAL SIGNS:  T(F): 97.9 (10-09-24 @ 05:18)  HR: 73 (10-09-24 @ 05:18)  BP: 139/72 (10-09-24 @ 05:18)  RR: 18 (10-09-24 @ 05:18)  SpO2: 93% (10-09-24 @ 05:18)  Wt(kg): --    PHYSICAL EXAM:    General: WN/WD NAD  Neurology: A&Ox3, nonfocal, SPRING x 4  Eyes: PERRLA/ EOMI, Gross vision intact  ENT/Neck: Neck supple, trachea midline, No JVD, Gross hearing intact  Respiratory: CTA B/L, No wheezing, rales, rhonchi  CV: RRR, +S1/S2, -S3/S4, no murmurs, rubs or gallops  Abdominal: Soft, NT, ND +BS, No HSM  MSK: 5/5 strength UE/LE bilaterally  Extremities: No edema, 2+ peripheral pulses  Skin: No Rashes, Hematoma, Ecchymosis  Incisions:   Tubes:    MEDICATIONS  (STANDING):  albuterol/ipratropium for Nebulization 3 milliLiter(s) Nebulizer every 6 hours  amLODIPine   Tablet 5 milliGRAM(s) Oral daily  azithromycin   Tablet 500 milliGRAM(s) Oral daily  dextrose 5%. 1000 milliLiter(s) (100 mL/Hr) IV Continuous <Continuous>  dextrose 5%. 1000 milliLiter(s) (50 mL/Hr) IV Continuous <Continuous>  dextrose 50% Injectable 12.5 Gram(s) IV Push once  dextrose 50% Injectable 25 Gram(s) IV Push once  dextrose 50% Injectable 25 Gram(s) IV Push once  dextrose Oral Gel 15 Gram(s) Oral once  enoxaparin Injectable 40 milliGRAM(s) SubCutaneous every 24 hours  glucagon  Injectable 1 milliGRAM(s) IntraMuscular once  influenza   Vaccine 0.5 milliLiter(s) IntraMuscular once  insulin glargine Injectable (LANTUS) 22 Unit(s) SubCutaneous at bedtime  insulin lispro (ADMELOG) corrective regimen sliding scale   SubCutaneous at bedtime  insulin lispro (ADMELOG) corrective regimen sliding scale   SubCutaneous three times a day before meals  insulin lispro Injectable (ADMELOG) 10 Unit(s) SubCutaneous three times a day before meals  predniSONE   Tablet 40 milliGRAM(s) Oral daily  senna 2 Tablet(s) Oral at bedtime    MEDICATIONS  (PRN):  albuterol    90 MICROgram(s) HFA Inhaler 2 Puff(s) Inhalation every 6 hours PRN Shortness of Breath      Allergies    No Known Allergies    Intolerances        LABS:                        15.1   13.57 )-----------( 350      ( 09 Oct 2024 04:40 )             43.3     10-08    136  |  98  |  17  ----------------------------<  284[H]  4.3   |  22  |  0.66    Ca    9.5      08 Oct 2024 06:20  Phos  3.5     10-08  Mg     2.20     10-08    TPro  7.3  /  Alb  4.1  /  TBili  0.2  /  DBili  x   /  AST  14  /  ALT  22  /  AlkPhos  78  10-08      Urinalysis Basic - ( 08 Oct 2024 06:20 )    Color: x / Appearance: x / SG: x / pH: x  Gluc: 284 mg/dL / Ketone: x  / Bili: x / Urobili: x   Blood: x / Protein: x / Nitrite: x   Leuk Esterase: x / RBC: x / WBC x   Sq Epi: x / Non Sq Epi: x / Bacteria: x        RADIOLOGY & ADDITIONAL TESTS:  Reviewed INTERVAL HPI/OVERNIGHT EVENTS:  Patient was seen and examined at bedside. Overnight patient requested transition to albuterol inhaler over duonebs. No complaints at this time. Patient denies: fever, chills, dizziness, weakness, HA, Changes in vision, CP, palpitations, SOB, cough, N/V/D/C, dysuria, changes in bowel  movements, LE edema. ROS otherwise negative.    VITAL SIGNS:  T(F): 97.9 (10-09-24 @ 05:18)  HR: 73 (10-09-24 @ 05:18)  BP: 139/72 (10-09-24 @ 05:18)  RR: 18 (10-09-24 @ 05:18)  SpO2: 93% (10-09-24 @ 05:18)  Wt(kg): --    PHYSICAL EXAM:    General: WN/WD NAD  Neurology: A&Ox3, nonfocal, SPRING x 4  Eyes: PERRLA/ EOMI, Gross vision intact  ENT/Neck: Neck supple, trachea midline, No JVD, Gross hearing intact  Respiratory: Trace wheezing b/l  CV: RRR, +S1/S2, -S3/S4, no murmurs, rubs or gallops  Abdominal: Soft, NT, ND +BS, No HSM  Extremities: No edema, 2+ peripheral pulses  Skin: No Rashes, Hematoma, Ecchymosis      MEDICATIONS  (STANDING):  albuterol/ipratropium for Nebulization 3 milliLiter(s) Nebulizer every 6 hours  amLODIPine   Tablet 5 milliGRAM(s) Oral daily  azithromycin   Tablet 500 milliGRAM(s) Oral daily  dextrose 5%. 1000 milliLiter(s) (100 mL/Hr) IV Continuous <Continuous>  dextrose 5%. 1000 milliLiter(s) (50 mL/Hr) IV Continuous <Continuous>  dextrose 50% Injectable 12.5 Gram(s) IV Push once  dextrose 50% Injectable 25 Gram(s) IV Push once  dextrose 50% Injectable 25 Gram(s) IV Push once  dextrose Oral Gel 15 Gram(s) Oral once  enoxaparin Injectable 40 milliGRAM(s) SubCutaneous every 24 hours  glucagon  Injectable 1 milliGRAM(s) IntraMuscular once  influenza   Vaccine 0.5 milliLiter(s) IntraMuscular once  insulin glargine Injectable (LANTUS) 22 Unit(s) SubCutaneous at bedtime  insulin lispro (ADMELOG) corrective regimen sliding scale   SubCutaneous at bedtime  insulin lispro (ADMELOG) corrective regimen sliding scale   SubCutaneous three times a day before meals  insulin lispro Injectable (ADMELOG) 10 Unit(s) SubCutaneous three times a day before meals  predniSONE   Tablet 40 milliGRAM(s) Oral daily  senna 2 Tablet(s) Oral at bedtime    MEDICATIONS  (PRN):  albuterol    90 MICROgram(s) HFA Inhaler 2 Puff(s) Inhalation every 6 hours PRN Shortness of Breath      Allergies    No Known Allergies    Intolerances        LABS:                        15.1   13.57 )-----------( 350      ( 09 Oct 2024 04:40 )             43.3     10-08    136  |  98  |  17  ----------------------------<  284[H]  4.3   |  22  |  0.66    Ca    9.5      08 Oct 2024 06:20  Phos  3.5     10-08  Mg     2.20     10-08    TPro  7.3  /  Alb  4.1  /  TBili  0.2  /  DBili  x   /  AST  14  /  ALT  22  /  AlkPhos  78  10-08      Urinalysis Basic - ( 08 Oct 2024 06:20 )    Color: x / Appearance: x / SG: x / pH: x  Gluc: 284 mg/dL / Ketone: x  / Bili: x / Urobili: x   Blood: x / Protein: x / Nitrite: x   Leuk Esterase: x / RBC: x / WBC x   Sq Epi: x / Non Sq Epi: x / Bacteria: x        RADIOLOGY & ADDITIONAL TESTS:  Reviewed

## 2024-10-09 NOTE — DIETITIAN INITIAL EVALUATION ADULT - PROBLEM SELECTOR PLAN 1
The patient has a 40 pack-year smoking history and asthma and presented with acute hypoxia with SpO2 at 89%, improved following steroids, duonebs, and antibiotics initiation.    - On 2L NC, wean O2 as tolerated  - Continue duonebs q6h  - Continue prednisone 40mg PO for 5 day course  - Continue azithromycin 500mg for 3 day course  - Blood gas in AM  - RSV/Covid/Flu negative  - CXR clear    - Will require pulmonology follow-up outpatient

## 2024-10-09 NOTE — DIETITIAN INITIAL EVALUATION ADULT - PERTINENT LABORATORY DATA
10-09    136  |  100  |  24[H]  ----------------------------<  203[H]  3.6   |  24  |  0.72    Ca    9.0      09 Oct 2024 04:40  Phos  3.2     10-09  Mg     2.10     10-09    TPro  6.8  /  Alb  4.1  /  TBili  0.4  /  DBili  x   /  AST  22  /  ALT  23  /  AlkPhos  66  10-09  POCT Blood Glucose.: 244 mg/dL (10-09-24 @ 08:29)  A1C with Estimated Average Glucose Result: 11.4 % (10-08-24 @ 06:20)

## 2024-10-09 NOTE — PROGRESS NOTE ADULT - ATTENDING COMMENTS
Pt with h/o HTN, asthma, anxiety, current smoker who presented with few days of URI symptoms, found to be hypoxic (now resolved), likely asthma exacerbation but with clinical suspicion for COPD. Also found to have hyperglycemia with A1c 11.1% c/w T2DM (new diagnosis on this admission.)   - overall SOB, wheezing, cough now much improved per patient; bedside lung POCUS with A-lines bilaterally today  - Flu/COVID/RSV negative  - CXR unremarkable  - C/w prednisone to complete 5 days  - 3 days azithromycin  - albuterol inhaler  - needs outpatient pulmonary evaluation with spirometry  - new dx T2DM with SIH - started weight based lantus/admelog; endo f/u appreciated and plan for d/c on trulicity weekly, lantus, and metformin; f/u w/ endo as outpt  - HLD - start atorvastatin; repeat lipid panel as outpt  - outpt PCP appt arranged with [facility] office; pulmonary HOME program referral made for post-discharge transitional care  - d/w patient at length and offered my contact info for questions post-discharge  - d/c care coordination with [facility]IRNA for d/c home today  time spent ~35 mins

## 2024-10-09 NOTE — DISCHARGE NOTE NURSING/CASE MANAGEMENT/SOCIAL WORK - NSDCFUADDAPPT_GEN_ALL_CORE_FT
APPTS ARE READY TO BE MADE: [] YES    Best Family or Patient Contact (if needed):    Additional Information about above appointments (if needed):    1:   2:   3:     Other comments or requests:

## 2024-10-09 NOTE — PROGRESS NOTE ADULT - SUBJECTIVE AND OBJECTIVE BOX
Chief Complaint: DM type 2     Interval Events: Fingersticks above goal.  Patient on prednisone 40 mg daily for 4 days, last dose on 10/11.  Good appetite. No N/V or abdominal pain.     MEDICATIONS  (STANDING):  albuterol/ipratropium for Nebulization 3 milliLiter(s) Nebulizer every 6 hours  atorvastatin 20 milliGRAM(s) Oral at bedtime  dextrose 5%. 1000 milliLiter(s) (100 mL/Hr) IV Continuous <Continuous>  dextrose 5%. 1000 milliLiter(s) (50 mL/Hr) IV Continuous <Continuous>  dextrose 50% Injectable 12.5 Gram(s) IV Push once  dextrose 50% Injectable 25 Gram(s) IV Push once  dextrose 50% Injectable 25 Gram(s) IV Push once  dextrose Oral Gel 15 Gram(s) Oral once  enoxaparin Injectable 40 milliGRAM(s) SubCutaneous every 24 hours  glucagon  Injectable 1 milliGRAM(s) IntraMuscular once  influenza   Vaccine 0.5 milliLiter(s) IntraMuscular once  insulin glargine Injectable (LANTUS) 22 Unit(s) SubCutaneous at bedtime  insulin lispro (ADMELOG) corrective regimen sliding scale   SubCutaneous at bedtime  insulin lispro (ADMELOG) corrective regimen sliding scale   SubCutaneous three times a day before meals  insulin lispro Injectable (ADMELOG) 10 Unit(s) SubCutaneous three times a day before meals  losartan 25 milliGRAM(s) Oral daily  predniSONE   Tablet 40 milliGRAM(s) Oral daily  senna 2 Tablet(s) Oral at bedtime    MEDICATIONS  (PRN):  albuterol    90 MICROgram(s) HFA Inhaler 2 Puff(s) Inhalation every 6 hours PRN Shortness of Breath      Allergies  No Known Allergies    Review of Systems:  Constitutional: No fever/chills   Eyes: No blurry vision  Neuro: No tremors  HEENT: No pain  Cardiovascular: No chest pain, no palpitations  Respiratory: No SOB, no cough  GI: No nausea, vomiting or abdominal pain  : No dysuria  Skin: no rash  Endocrine: no polyuria, polydipsia        VITALS: T(C): 36.6 (10-09-24 @ 05:18)  T(F): 97.9 (10-09-24 @ 05:18), Max: 97.9 (10-09-24 @ 05:18)  HR: 73 (10-09-24 @ 05:18) (73 - 89)  BP: 139/72 (10-09-24 @ 05:18) (139/72 - 150/67)  RR:  (18 - 18)  SpO2:  (93% - 94%)  Wt(kg): --      Physical Exam:   GENERAL: NAD, well-groomed, well-developed  EYES: No proptosis, no lid lag, anicteric  HEENT:  Atraumatic, Normocephalic, moist mucous membranes  RESPIRATORY: non labored breathing   GI: Soft, nontender, non distended  SKIN: Dry, intact, No rashes or lesions  MUSCULOSKELETAL: Full range of motion, normal strength  NEURO: extraocular movements intact, no tremor  PSYCH: Alert and oriented x 3, normal affect, normal mood    CAPILLARY BLOOD GLUCOSE  POCT Blood Glucose.: 235 mg/dL (09 Oct 2024 12:04)  POCT Blood Glucose.: 244 mg/dL (09 Oct 2024 08:29)  POCT Blood Glucose.: 193 mg/dL (08 Oct 2024 22:28)  POCT Blood Glucose.: 273 mg/dL (08 Oct 2024 17:55)      10-09    136  |  100  |  24[H]  ----------------------------<  203[H]  3.6   |  24  |  0.72    eGFR: 105    Ca    9.0      10-09  Mg     2.10     10-09  Phos  3.2     10-09    TPro  6.8  /  Alb  4.1  /  TBili  0.4  /  DBili  x   /  AST  22  /  ALT  23  /  AlkPhos  66  10-09      A1C with Estimated Average Glucose Result: 11.4 % (10-08-24 @ 06:20)

## 2024-10-09 NOTE — DISCHARGE NOTE PROVIDER - NSDCMRMEDTOKEN_GEN_ALL_CORE_FT
alcohol swabs: Apply topically to affected area 4 times a day  Freestyle Anna 3 [place]: Dispense 1 [place]  Freestyle Anna 3 Sensors: Please change every 14 days  glucometer (per patient&#x27;s insurance): Test blood sugars four times a day. Dispense #1 glucometer.  lancets: 1 application subcutaneously 4 times a day  metFORMIN 500 mg oral tablet: 1 tab(s) orally 2 times a day (with meals) for one week. If tolerating on day 8, increase to two tablets (1000 mG) twice a day (with meals).  Primatene Mist 0.125 mg/inh inhalation aerosol: 1 puff(s) inhaled every 4 hours as needed for  bronchospasm  test strips (per patient&#x27;s insurance): 1 application subcutaneously 4 times a day. ** Compatible with patient&#x27;s glucometer **  Trulicity Pen 0.75 mg/0.5 mL subcutaneous solution: 0.75 milligram(s) subcutaneously once a week   albuterol 90 mcg/inh inhalation aerosol: 2 puff(s) inhaled every 6 hours as needed for Shortness of Breath  alcohol swabs: Apply topically to affected area 4 times a day  atorvastatin 20 mg oral tablet: 1 tab(s) orally once a day (at bedtime) Please take one (1) tablet daily at bedtime  Freestyle Anna 3 [place]: Dispense 1 [place]  Freestyle Anna 3 Sensors: Please change every 14 days  glucometer (per patient&#x27;s insurance): Test blood sugars four times a day. Dispense #1 glucometer.  glucose tablets: Follow instructions on bottle when sugar is low.  Insulin Pen Needles, 4mm: 1 application subcutaneously 4 times a day. ** Use with insulin pen **  lancets: 1 application subcutaneously 4 times a day  Lantus Solostar Pen 100 units/mL subcutaneous solution: 24 unit(s) subcutaneous once a day (at bedtime)  losartan 25 mg oral tablet: 1 tab(s) orally once a day  metFORMIN 500 mg oral tablet: 1 tab(s) orally 2 times a day (with meals) for one week. If tolerating on day 8, increase to two tablets (1000 mG) twice a day (with meals).  test strips (per patient&#x27;s insurance): 1 application subcutaneously 4 times a day. ** Compatible with patient&#x27;s glucometer **  Trulicity Pen 0.75 mg/0.5 mL subcutaneous solution: 0.75 milligram(s) subcutaneously once a week

## 2024-10-10 DIAGNOSIS — J44.9 CHRONIC OBSTRUCTIVE PULMONARY DISEASE, UNSPECIFIED: ICD-10-CM

## 2024-10-10 DIAGNOSIS — I10 ESSENTIAL (PRIMARY) HYPERTENSION: ICD-10-CM

## 2024-10-10 DIAGNOSIS — E11.9 TYPE 2 DIABETES MELLITUS W/OUT COMPLICATIONS: ICD-10-CM

## 2024-10-10 DIAGNOSIS — E78.5 HYPERLIPIDEMIA, UNSPECIFIED: ICD-10-CM

## 2024-10-10 PROBLEM — Z00.00 ENCOUNTER FOR PREVENTIVE HEALTH EXAMINATION: Status: ACTIVE | Noted: 2024-10-10

## 2024-10-10 RX ORDER — BLOOD-GLUCOSE SENSOR
EACH MISCELLANEOUS
Qty: 2 | Refills: 0 | Status: ACTIVE | COMMUNITY
Start: 2024-10-10

## 2024-10-10 RX ORDER — ISOPROPYL ALCOHOL 0.7 ML/ML
SWAB TOPICAL
Qty: 100 | Refills: 0 | Status: ACTIVE | COMMUNITY
Start: 2024-10-10

## 2024-10-10 RX ORDER — LANCETS 33 GAUGE
EACH MISCELLANEOUS
Qty: 100 | Refills: 0 | Status: ACTIVE | COMMUNITY
Start: 2024-10-10

## 2024-10-10 RX ORDER — METFORMIN HYDROCHLORIDE 500 MG/1
500 TABLET, COATED ORAL
Qty: 60 | Refills: 0 | Status: ACTIVE | COMMUNITY
Start: 2024-10-10

## 2024-10-10 RX ORDER — INSULIN GLARGINE 100 [IU]/ML
100 INJECTION, SOLUTION SUBCUTANEOUS DAILY
Qty: 1 | Refills: 0 | Status: ACTIVE | COMMUNITY
Start: 2024-10-10

## 2024-10-10 RX ORDER — ALBUTEROL SULFATE 90 UG/1
108 (90 BASE) INHALANT RESPIRATORY (INHALATION) EVERY 6 HOURS
Qty: 1 | Refills: 0 | Status: ACTIVE | COMMUNITY
Start: 2024-10-10

## 2024-10-10 RX ORDER — LOSARTAN POTASSIUM 25 MG/1
25 TABLET, FILM COATED ORAL DAILY
Qty: 30 | Refills: 0 | Status: ACTIVE | COMMUNITY
Start: 2024-10-10

## 2024-10-10 RX ORDER — DULAGLUTIDE 0.75 MG/.5ML
0.75 INJECTION, SOLUTION SUBCUTANEOUS WEEKLY
Qty: 1 | Refills: 0 | Status: ACTIVE | COMMUNITY
Start: 2024-10-10

## 2024-10-10 RX ORDER — ATORVASTATIN CALCIUM 20 MG/1
20 TABLET, FILM COATED ORAL
Qty: 30 | Refills: 0 | Status: ACTIVE | COMMUNITY
Start: 2024-10-10

## 2024-10-10 RX ORDER — ELECTROLYTES/DEXTROSE
32G X 4 MM SOLUTION, ORAL ORAL
Qty: 100 | Refills: 0 | Status: ACTIVE | COMMUNITY
Start: 2024-10-10

## 2024-10-10 RX ORDER — BLOOD SUGAR DIAGNOSTIC
STRIP MISCELLANEOUS
Qty: 100 | Refills: 0 | Status: ACTIVE | COMMUNITY
Start: 2024-10-10

## 2024-10-11 ENCOUNTER — APPOINTMENT (OUTPATIENT)
Dept: PULMONOLOGY | Facility: CLINIC | Age: 59
End: 2024-10-11
Payer: MEDICAID

## 2024-10-23 ENCOUNTER — APPOINTMENT (OUTPATIENT)
Dept: INTERNAL MEDICINE | Facility: CLINIC | Age: 59
End: 2024-10-23

## 2024-10-23 ENCOUNTER — OUTPATIENT (OUTPATIENT)
Dept: OUTPATIENT SERVICES | Facility: HOSPITAL | Age: 59
LOS: 1 days | End: 2024-10-23

## 2024-10-23 VITALS — BODY MASS INDEX: 31.83 KG/M2 | HEIGHT: 72 IN | OXYGEN SATURATION: 98 % | WEIGHT: 235 LBS | HEART RATE: 89 BPM

## 2024-10-23 VITALS — SYSTOLIC BLOOD PRESSURE: 110 MMHG | DIASTOLIC BLOOD PRESSURE: 72 MMHG

## 2024-10-23 DIAGNOSIS — E78.5 HYPERLIPIDEMIA, UNSPECIFIED: ICD-10-CM

## 2024-10-23 DIAGNOSIS — I10 ESSENTIAL (PRIMARY) HYPERTENSION: ICD-10-CM

## 2024-10-23 DIAGNOSIS — E11.9 TYPE 2 DIABETES MELLITUS W/OUT COMPLICATIONS: ICD-10-CM

## 2024-10-23 PROCEDURE — 99204 OFFICE O/P NEW MOD 45 MIN: CPT | Mod: GC

## 2024-10-23 RX ORDER — LOSARTAN POTASSIUM 25 MG/1
25 TABLET, FILM COATED ORAL DAILY
Qty: 3 | Refills: 0 | Status: ACTIVE | COMMUNITY
Start: 2024-10-23 | End: 1900-01-01

## 2024-10-23 RX ORDER — METFORMIN HYDROCHLORIDE 500 MG/1
500 TABLET, COATED ORAL
Qty: 180 | Refills: 0 | Status: ACTIVE | COMMUNITY
Start: 2024-10-23 | End: 1900-01-01

## 2024-10-23 RX ORDER — ALBUTEROL SULFATE 90 UG/1
108 (90 BASE) INHALANT RESPIRATORY (INHALATION)
Qty: 2 | Refills: 0 | Status: ACTIVE | COMMUNITY
Start: 2024-10-23 | End: 1900-01-01

## 2024-10-23 RX ORDER — INSULIN GLARGINE 100 [IU]/ML
100 INJECTION, SOLUTION SUBCUTANEOUS
Qty: 2 | Refills: 0 | Status: ACTIVE | COMMUNITY
Start: 2024-10-23 | End: 1900-01-01

## 2024-10-23 RX ORDER — DULAGLUTIDE 0.75 MG/.5ML
0.75 INJECTION, SOLUTION SUBCUTANEOUS
Qty: 3 | Refills: 0 | Status: ACTIVE | COMMUNITY
Start: 2024-10-23 | End: 1900-01-01

## 2024-10-23 RX ORDER — ATORVASTATIN CALCIUM 20 MG/1
20 TABLET, FILM COATED ORAL DAILY
Qty: 90 | Refills: 0 | Status: ACTIVE | COMMUNITY
Start: 2024-10-23 | End: 1900-01-01

## 2024-10-23 RX ORDER — BLOOD-GLUCOSE SENSOR
EACH MISCELLANEOUS
Qty: 6 | Refills: 0 | Status: ACTIVE | COMMUNITY
Start: 2024-10-23 | End: 1900-01-01

## 2024-10-24 PROBLEM — I10 ESSENTIAL (PRIMARY) HYPERTENSION: Chronic | Status: ACTIVE | Noted: 2024-10-07

## 2024-10-24 PROBLEM — F17.200 NICOTINE DEPENDENCE, UNSPECIFIED, UNCOMPLICATED: Chronic | Status: ACTIVE | Noted: 2024-10-07

## 2024-10-24 PROBLEM — J45.909 UNSPECIFIED ASTHMA, UNCOMPLICATED: Chronic | Status: ACTIVE | Noted: 2024-10-07

## 2024-10-28 ENCOUNTER — APPOINTMENT (OUTPATIENT)
Dept: PULMONOLOGY | Facility: CLINIC | Age: 59
End: 2024-10-28
Payer: MEDICAID

## 2024-10-28 VITALS
HEART RATE: 90 BPM | DIASTOLIC BLOOD PRESSURE: 77 MMHG | WEIGHT: 230 LBS | HEIGHT: 70 IN | SYSTOLIC BLOOD PRESSURE: 119 MMHG | BODY MASS INDEX: 32.93 KG/M2

## 2024-10-28 DIAGNOSIS — Z00.00 ENCOUNTER FOR GENERAL ADULT MEDICAL EXAMINATION W/OUT ABNORMAL FINDINGS: ICD-10-CM

## 2024-10-28 DIAGNOSIS — Z72.0 TOBACCO USE: ICD-10-CM

## 2024-10-28 DIAGNOSIS — Z28.21 IMMUNIZATION NOT CARRIED OUT BECAUSE OF PATIENT REFUSAL: ICD-10-CM

## 2024-10-28 DIAGNOSIS — J44.9 CHRONIC OBSTRUCTIVE PULMONARY DISEASE, UNSPECIFIED: ICD-10-CM

## 2024-10-28 PROCEDURE — 94729 DIFFUSING CAPACITY: CPT

## 2024-10-28 PROCEDURE — 94060 EVALUATION OF WHEEZING: CPT

## 2024-10-28 PROCEDURE — 94726 PLETHYSMOGRAPHY LUNG VOLUMES: CPT

## 2024-10-28 PROCEDURE — ZZZZZ: CPT

## 2024-10-28 PROCEDURE — 99406 BEHAV CHNG SMOKING 3-10 MIN: CPT

## 2024-10-28 PROCEDURE — G0296 VISIT TO DETERM LDCT ELIG: CPT

## 2024-10-28 PROCEDURE — 99205 OFFICE O/P NEW HI 60 MIN: CPT | Mod: 25

## 2024-10-28 RX ORDER — BUDESONIDE, GLYCOPYRROLATE, AND FORMOTEROL FUMARATE 160; 9; 4.8 UG/1; UG/1; UG/1
160-9-4.8 AEROSOL, METERED RESPIRATORY (INHALATION) TWICE DAILY
Qty: 1 | Refills: 6 | Status: ACTIVE | COMMUNITY
Start: 2024-10-28 | End: 1900-01-01

## 2024-10-28 RX ORDER — NICOTINE 21-14-7MG
21-14-7 KIT TRANSDERMAL
Qty: 1 | Refills: 1 | Status: ACTIVE | COMMUNITY
Start: 2024-10-28 | End: 1900-01-01

## 2024-10-28 RX ORDER — ALBUTEROL SULFATE 90 UG/1
108 (90 BASE) INHALANT RESPIRATORY (INHALATION)
Qty: 3 | Refills: 3 | Status: ACTIVE | COMMUNITY
Start: 2024-10-28 | End: 1900-01-01

## 2024-10-28 RX ORDER — NICOTINE POLACRILEX 2 MG/1
2 LOZENGE ORAL
Qty: 1 | Refills: 6 | Status: ACTIVE | COMMUNITY
Start: 2024-10-28 | End: 1900-01-01

## 2024-10-30 ENCOUNTER — APPOINTMENT (OUTPATIENT)
Dept: PODIATRY | Facility: HOSPITAL | Age: 59
End: 2024-10-30

## 2024-11-04 ENCOUNTER — OUTPATIENT (OUTPATIENT)
Dept: OUTPATIENT SERVICES | Facility: HOSPITAL | Age: 59
LOS: 1 days | End: 2024-11-04
Payer: MEDICAID

## 2024-11-04 ENCOUNTER — APPOINTMENT (OUTPATIENT)
Dept: PODIATRY | Facility: HOSPITAL | Age: 59
End: 2024-11-04
Payer: MEDICAID

## 2024-11-04 VITALS
HEIGHT: 70 IN | DIASTOLIC BLOOD PRESSURE: 75 MMHG | BODY MASS INDEX: 33.21 KG/M2 | TEMPERATURE: 97.7 F | HEART RATE: 81 BPM | RESPIRATION RATE: 15 BRPM | SYSTOLIC BLOOD PRESSURE: 133 MMHG | OXYGEN SATURATION: 96 % | WEIGHT: 232 LBS

## 2024-11-04 VITALS
TEMPERATURE: 97.7 F | SYSTOLIC BLOOD PRESSURE: 133 MMHG | HEIGHT: 70 IN | DIASTOLIC BLOOD PRESSURE: 75 MMHG | WEIGHT: 232 LBS | OXYGEN SATURATION: 96 % | RESPIRATION RATE: 16 BRPM | HEART RATE: 81 BPM | BODY MASS INDEX: 33.21 KG/M2

## 2024-11-04 DIAGNOSIS — Z98.890 OTHER SPECIFIED POSTPROCEDURAL STATES: Chronic | ICD-10-CM

## 2024-11-04 DIAGNOSIS — B35.1 TINEA UNGUIUM: ICD-10-CM

## 2024-11-04 DIAGNOSIS — E11.9 TYPE 2 DIABETES MELLITUS WITHOUT COMPLICATIONS: ICD-10-CM

## 2024-11-04 DIAGNOSIS — E11.42 TYPE 2 DIABETES MELLITUS WITH DIABETIC POLYNEUROPATHY: ICD-10-CM

## 2024-11-04 DIAGNOSIS — E11.9 TYPE 2 DIABETES MELLITUS W/OUT COMPLICATIONS: ICD-10-CM

## 2024-11-04 DIAGNOSIS — M79.609 PAIN IN UNSPECIFIED LIMB: ICD-10-CM

## 2024-11-04 PROCEDURE — 99202 OFFICE O/P NEW SF 15 MIN: CPT

## 2024-11-04 PROCEDURE — G0463: CPT

## 2024-11-07 RX ORDER — BLOOD-GLUCOSE,RECEIVER,CONT
EACH MISCELLANEOUS
Qty: 5 | Refills: 3 | Status: ACTIVE | COMMUNITY
Start: 2024-11-07 | End: 1900-01-01

## 2024-11-07 RX ORDER — BLOOD-GLUCOSE SENSOR
EACH MISCELLANEOUS
Qty: 3 | Refills: 3 | Status: ACTIVE | COMMUNITY
Start: 2024-11-07 | End: 1900-01-01

## 2024-11-19 ENCOUNTER — NON-APPOINTMENT (OUTPATIENT)
Age: 59
End: 2024-11-19

## 2024-12-12 ENCOUNTER — RX RENEWAL (OUTPATIENT)
Age: 59
End: 2024-12-12

## 2024-12-20 ENCOUNTER — NON-APPOINTMENT (OUTPATIENT)
Age: 59
End: 2024-12-20

## 2025-01-02 ENCOUNTER — APPOINTMENT (OUTPATIENT)
Dept: INTERNAL MEDICINE | Facility: CLINIC | Age: 60
End: 2025-01-02

## 2025-01-27 ENCOUNTER — APPOINTMENT (OUTPATIENT)
Dept: PODIATRY | Facility: HOSPITAL | Age: 60
End: 2025-01-27

## 2025-01-27 DIAGNOSIS — Z28.21 IMMUNIZATION NOT CARRIED OUT BECAUSE OF PATIENT REFUSAL: ICD-10-CM

## 2025-01-28 ENCOUNTER — APPOINTMENT (OUTPATIENT)
Dept: UROLOGY | Facility: CLINIC | Age: 60
End: 2025-01-28
Payer: MEDICAID

## 2025-01-28 VITALS
DIASTOLIC BLOOD PRESSURE: 71 MMHG | BODY MASS INDEX: 30.07 KG/M2 | HEIGHT: 72 IN | RESPIRATION RATE: 16 BRPM | WEIGHT: 222 LBS | SYSTOLIC BLOOD PRESSURE: 113 MMHG | HEART RATE: 91 BPM | TEMPERATURE: 97.2 F

## 2025-01-28 DIAGNOSIS — R97.20 ELEVATED PROSTATE, SPECIFIC ANTIGEN [PSA]: ICD-10-CM

## 2025-01-28 DIAGNOSIS — N52.9 MALE ERECTILE DYSFUNCTION, UNSPECIFIED: ICD-10-CM

## 2025-01-28 PROCEDURE — 99204 OFFICE O/P NEW MOD 45 MIN: CPT

## 2025-01-28 PROCEDURE — G2211 COMPLEX E/M VISIT ADD ON: CPT | Mod: NC

## 2025-01-28 RX ORDER — TADALAFIL 5 MG/1
5 TABLET ORAL
Qty: 90 | Refills: 3 | Status: ACTIVE | COMMUNITY
Start: 2025-01-28 | End: 1900-01-01

## 2025-01-29 LAB
PSA FREE FLD-MCNC: 16 %
PSA FREE SERPL-MCNC: 1.78 NG/ML
PSA SERPL-MCNC: 11.1 NG/ML

## 2025-02-03 ENCOUNTER — NON-APPOINTMENT (OUTPATIENT)
Age: 60
End: 2025-02-03

## 2025-02-04 ENCOUNTER — APPOINTMENT (OUTPATIENT)
Dept: MRI IMAGING | Facility: IMAGING CENTER | Age: 60
End: 2025-02-04
Payer: MEDICAID

## 2025-02-04 ENCOUNTER — RESULT REVIEW (OUTPATIENT)
Age: 60
End: 2025-02-04

## 2025-02-04 ENCOUNTER — OUTPATIENT (OUTPATIENT)
Dept: OUTPATIENT SERVICES | Facility: HOSPITAL | Age: 60
LOS: 1 days | End: 2025-02-04
Payer: MEDICAID

## 2025-02-04 DIAGNOSIS — R97.20 ELEVATED PROSTATE SPECIFIC ANTIGEN [PSA]: ICD-10-CM

## 2025-02-04 DIAGNOSIS — Z98.890 OTHER SPECIFIED POSTPROCEDURAL STATES: Chronic | ICD-10-CM

## 2025-02-04 PROCEDURE — 72197 MRI PELVIS W/O & W/DYE: CPT | Mod: 26

## 2025-02-04 PROCEDURE — 76498P: CUSTOM | Mod: 26

## 2025-02-04 PROCEDURE — 72197 MRI PELVIS W/O & W/DYE: CPT

## 2025-02-04 PROCEDURE — 76498 UNLISTED MR PROCEDURE: CPT

## 2025-02-04 PROCEDURE — A9585: CPT

## 2025-02-09 PROBLEM — R93.5 ABNORMAL MRI, PELVIS: Status: ACTIVE | Noted: 2025-02-09

## 2025-02-11 ENCOUNTER — OUTPATIENT (OUTPATIENT)
Dept: OUTPATIENT SERVICES | Facility: HOSPITAL | Age: 60
LOS: 1 days | End: 2025-02-11

## 2025-02-11 DIAGNOSIS — Z00.8 ENCOUNTER FOR OTHER GENERAL EXAMINATION: ICD-10-CM

## 2025-02-11 DIAGNOSIS — Z98.890 OTHER SPECIFIED POSTPROCEDURAL STATES: Chronic | ICD-10-CM

## 2025-02-12 ENCOUNTER — OUTPATIENT (OUTPATIENT)
Dept: OUTPATIENT SERVICES | Facility: HOSPITAL | Age: 60
LOS: 1 days | End: 2025-02-12
Payer: MEDICAID

## 2025-02-12 DIAGNOSIS — Z98.890 OTHER SPECIFIED POSTPROCEDURAL STATES: Chronic | ICD-10-CM

## 2025-02-12 DIAGNOSIS — R97.20 ELEVATED PROSTATE SPECIFIC ANTIGEN [PSA]: ICD-10-CM

## 2025-02-12 PROCEDURE — C8001: CPT

## 2025-02-13 ENCOUNTER — APPOINTMENT (OUTPATIENT)
Dept: UROLOGY | Facility: CLINIC | Age: 60
End: 2025-02-13
Payer: MEDICAID

## 2025-02-13 VITALS
BODY MASS INDEX: 30.61 KG/M2 | WEIGHT: 226 LBS | OXYGEN SATURATION: 95 % | HEIGHT: 72 IN | HEART RATE: 90 BPM | RESPIRATION RATE: 16 BRPM | DIASTOLIC BLOOD PRESSURE: 78 MMHG | SYSTOLIC BLOOD PRESSURE: 165 MMHG

## 2025-02-13 DIAGNOSIS — R97.20 ELEVATED PROSTATE, SPECIFIC ANTIGEN [PSA]: ICD-10-CM

## 2025-02-13 PROCEDURE — 99204 OFFICE O/P NEW MOD 45 MIN: CPT

## 2025-02-13 PROCEDURE — G2211 COMPLEX E/M VISIT ADD ON: CPT | Mod: NC

## 2025-03-04 ENCOUNTER — APPOINTMENT (OUTPATIENT)
Dept: UROLOGY | Facility: CLINIC | Age: 60
End: 2025-03-04

## 2025-03-10 ENCOUNTER — APPOINTMENT (OUTPATIENT)
Dept: UROLOGY | Facility: CLINIC | Age: 60
End: 2025-03-10

## 2025-03-10 ENCOUNTER — OUTPATIENT (OUTPATIENT)
Dept: OUTPATIENT SERVICES | Facility: HOSPITAL | Age: 60
LOS: 1 days | End: 2025-03-10

## 2025-03-10 VITALS
DIASTOLIC BLOOD PRESSURE: 77 MMHG | RESPIRATION RATE: 18 BRPM | HEART RATE: 76 BPM | WEIGHT: 229.06 LBS | TEMPERATURE: 98 F | OXYGEN SATURATION: 96 % | SYSTOLIC BLOOD PRESSURE: 167 MMHG | HEIGHT: 70 IN

## 2025-03-10 DIAGNOSIS — Z90.89 ACQUIRED ABSENCE OF OTHER ORGANS: Chronic | ICD-10-CM

## 2025-03-10 DIAGNOSIS — Z91.89 OTHER SPECIFIED PERSONAL RISK FACTORS, NOT ELSEWHERE CLASSIFIED: ICD-10-CM

## 2025-03-10 DIAGNOSIS — R97.20 ELEVATED PROSTATE SPECIFIC ANTIGEN [PSA]: ICD-10-CM

## 2025-03-10 DIAGNOSIS — Z98.890 OTHER SPECIFIED POSTPROCEDURAL STATES: Chronic | ICD-10-CM

## 2025-03-10 DIAGNOSIS — E11.9 TYPE 2 DIABETES MELLITUS WITHOUT COMPLICATIONS: ICD-10-CM

## 2025-03-10 NOTE — H&P PST ADULT - HISTORY OF PRESENT ILLNESS
59-year-old male with H/O: HTN. COPD/Asthma, DM typer 2, BPH,, obesity, daily smoker,     and who presented for evaluation of left-sided chest pain that began at 4 AM on 10/7.  The patient had been experiencing URI-like symptoms over the past week with subjective fever, chills, generalized myalgias, productive cough, nasal congestion, and rhinorrhea which the patient had been treating with Tylenol and DayQuil/NyQuil as needed. On the morning of 10/7, the patient awoke with left-sided sharp chest pain and a sensation of numbness and tingling to his toes as well as his left upper extremity. The patient prefers to avoid seeking medical care, but was heavily encouraged by his family to report to the hospital given his presenting symptoms.    MRI suspicious lesion     ED Course    Patient stated that his pain was intermittent and associated with difficulty breathing and wheezing.  He denied any nausea, vomiting, diarrhea, abdominal pain, dysuria, hematuria, flank pain, headache, changes in vision, changes in speech, weakness, recent travel, or sick contacts.    EKG on admission consistent with sinus tach and 1st degree AV block with non-specific T-wave abnormalities. Subsequent troponin was wnl (13). The patient appeared comfortable at the bedside with diffuse inspiratory and expiratory wheezes to bilateral lung fields. The patient eventually desaturated to 89% on RA.     59-year-old male with H/O: HTN. COPD/Asthma, DM typer 2 diagnosed on 10/2024, BPH, obesity, presents for pre op evaluation with h/o elevated PSA S/P MRI which showed a "suspicious lesion". Now schedule for MRI US transperineal prostate biopsy tentatively on 03/14/2025

## 2025-03-10 NOTE — H&P PST ADULT - PROBLEM SELECTOR PLAN 3
Monitor BS A.M of surgery. Pt instructed to decrease dose of Lantus by 20% the night before surgery, take 19.2 units. Also instructed not to take Trulicity 1 week pre op, last dose 03/08/2025.

## 2025-03-10 NOTE — H&P PST ADULT - NSICDXPASTSURGICALHX_GEN_ALL_CORE_FT
PAST SURGICAL HISTORY:  History of appendectomy     S/P knee surgery      PAST SURGICAL HISTORY:  History of appendectomy     History of arthroscopy of left shoulder     History of tonsillectomy     S/P knee surgery

## 2025-03-10 NOTE — H&P PST ADULT - NEGATIVE ENMT SYMPTOMS
no ear pain/no tinnitus/no vertigo/no nasal obstruction/no nose bleeds/no throat pain/no dysphagia no ear pain/no tinnitus/no vertigo/no sinus symptoms/no nasal congestion/no nasal obstruction/no post-nasal discharge/no nose bleeds/no throat pain/no dysphagia

## 2025-03-10 NOTE — H&P PST ADULT - GASTROINTESTINAL
negative soft/nontender/nondistended/normal active bowel sounds soft/nontender/nondistended/normal active bowel sounds/no organomegaly

## 2025-03-10 NOTE — H&P PST ADULT - NSICDXPASTMEDICALHX_GEN_ALL_CORE_FT
PAST MEDICAL HISTORY:  Asthma     Current every day smoker     Hypertension      PAST MEDICAL HISTORY:  Asthma     COPD (chronic obstructive pulmonary disease)     Current every day smoker     Hypertension     Type 2 diabetes mellitus      PAST MEDICAL HISTORY:  Asthma     COPD (chronic obstructive pulmonary disease)     Current every day smoker     Hypertension     Obesity     Type 2 diabetes mellitus

## 2025-03-10 NOTE — H&P PST ADULT - ENDOCRINE COMMENTS
Right arm Dexcom in place, last A1C 6/7 % ~ 2 months ago Right arm Dexcom in place, pt stated that last A1C 6/7 % done by PCP ~ 2 months ago. Right arm CGM in place, pt stated that last A1C 6/7 % done by PCP ~ 2 months ago.

## 2025-03-10 NOTE — H&P PST ADULT - NEGATIVE CARDIOVASCULAR SYMPTOMS
no chest pain/no paroxysmal nocturnal dyspnea/no peripheral edema/no claudication no chest pain/no palpitations/no dyspnea on exertion/no orthopnea/no paroxysmal nocturnal dyspnea/no peripheral edema/no claudication

## 2025-03-10 NOTE — H&P PST ADULT - PROBLEM SELECTOR PLAN 1
Schedule for MRI US fusion transperineal prostate biopsy tentatively on 03/13/2025. Pre op instructions, famotidine given and explained. Pt verbalized understanding.

## 2025-03-10 NOTE — H&P PST ADULT - NEUROLOGICAL
negative cranial nerves II-XII intact/sensation intact/responds to pain/responds to verbal commands/upper extremity  reflex

## 2025-03-10 NOTE — H&P PST ADULT - CARDIOVASCULAR
details… regular rate and rhythm/S1 S2 present/no pedal edema regular rate and rhythm/S1 S2 present/normal PMI/no pedal edema

## 2025-03-11 PROBLEM — J45.909 UNSPECIFIED ASTHMA, UNCOMPLICATED: Chronic | Status: INACTIVE | Noted: 2024-10-07 | Resolved: 2025-03-10

## 2025-03-11 LAB
CULTURE RESULTS: SIGNIFICANT CHANGE UP
SPECIMEN SOURCE: SIGNIFICANT CHANGE UP

## 2025-03-14 ENCOUNTER — RESULT REVIEW (OUTPATIENT)
Age: 60
End: 2025-03-14

## 2025-03-14 ENCOUNTER — OUTPATIENT (OUTPATIENT)
Dept: OUTPATIENT SERVICES | Facility: HOSPITAL | Age: 60
LOS: 1 days | Discharge: HOME | End: 2025-03-14
Payer: MEDICAID

## 2025-03-14 ENCOUNTER — TRANSCRIPTION ENCOUNTER (OUTPATIENT)
Age: 60
End: 2025-03-14

## 2025-03-14 ENCOUNTER — APPOINTMENT (OUTPATIENT)
Dept: UROLOGY | Facility: AMBULATORY SURGERY CENTER | Age: 60
End: 2025-03-14

## 2025-03-14 VITALS
TEMPERATURE: 97 F | HEART RATE: 72 BPM | SYSTOLIC BLOOD PRESSURE: 115 MMHG | OXYGEN SATURATION: 97 % | DIASTOLIC BLOOD PRESSURE: 66 MMHG

## 2025-03-14 VITALS
HEART RATE: 77 BPM | TEMPERATURE: 98 F | RESPIRATION RATE: 18 BRPM | SYSTOLIC BLOOD PRESSURE: 149 MMHG | OXYGEN SATURATION: 95 % | DIASTOLIC BLOOD PRESSURE: 77 MMHG | WEIGHT: 229.28 LBS

## 2025-03-14 DIAGNOSIS — Z90.89 ACQUIRED ABSENCE OF OTHER ORGANS: Chronic | ICD-10-CM

## 2025-03-14 DIAGNOSIS — Z98.890 OTHER SPECIFIED POSTPROCEDURAL STATES: Chronic | ICD-10-CM

## 2025-03-14 DIAGNOSIS — R97.20 ELEVATED PROSTATE SPECIFIC ANTIGEN [PSA]: ICD-10-CM

## 2025-03-14 LAB — GLUCOSE BLDC GLUCOMTR-MCNC: 90 MG/DL — SIGNIFICANT CHANGE UP (ref 70–99)

## 2025-03-14 PROCEDURE — 88381 MICRODISSECTION MANUAL: CPT | Mod: 26

## 2025-03-14 PROCEDURE — 76999F: CUSTOM | Mod: 26

## 2025-03-14 PROCEDURE — G0416: CPT | Mod: 26

## 2025-03-14 PROCEDURE — 55700: CPT

## 2025-03-14 NOTE — ASU DISCHARGE PLAN (ADULT/PEDIATRIC) - CARE PROVIDER_API CALL
Ben Kirkpatrick.  Urology  [address] [place] [address], [address] [address]  Phone: [phone]  Fax: [phone]  Follow Up Time: 1 month

## 2025-03-14 NOTE — ASU PREOPERATIVE ASSESSMENT, ADULT ([facility]ARK ONLY) - FALL HARM RISK - UNIVERSAL INTERVENTIONS
Bed in lowest position, wheels locked, appropriate side rails in place/Call bell, personal items and telephone in reach/Instruct patient to call for assistance before getting out of bed or chair/Non-slip footwear when patient is out of bed/[place] to call system/Physically safe environment - no spills, clutter or unnecessary equipment/Purposeful Proactive Rounding/Room/bathroom lighting operational, light cord in reach
Detail Level: Zone

## 2025-03-14 NOTE — ASU DISCHARGE PLAN (ADULT/PEDIATRIC) - NS MD DC FALL RISK RISK
For information on Fall & Injury Prevention, visit: [email or web address] OR  [email or web address] OR  https://www.cdc.gov/steadi/patient.html

## 2025-03-14 NOTE — ASU DISCHARGE PLAN (ADULT/PEDIATRIC) - FINANCIAL ASSISTANCE
[facility] provides services at a reduced cost to those who are determined to be eligible through [facility]’s financial assistance program. Information regarding [facility]’s financial assistance program can be found by going to [email or web address] or by calling [phone].

## 2025-03-14 NOTE — ASU DISCHARGE PLAN (ADULT/PEDIATRIC) - "IF YOU OR YOUR GUARDIAN/FAMILY IS A SMOKER, IT IS IMPORTANT FOR YOUR HEALTH TO STOP SMOKING. PLEASE BE AWARE THAT SECOND HAND SMOKE IS ALSO HARMFUL."
pt admitted for acute mary, plan for lap mary pt admitted for abdominal pain, pending OR for lap mary Statement Selected

## 2025-03-14 NOTE — ASU DISCHARGE PLAN (ADULT/PEDIATRIC) - ASU DC SPECIAL INSTRUCTIONSFT
You may have blood in your urine intermittently over the next week, as well as blood in your semen for several weeks afterward. This is normal after a prostate biopsy. You have a dressing covering your perineum which can be removed once at home. For pain, you may take Tylenol and Motrin available over the counter. Dr. Kirkpatrick will call you with the results of the biopsy as well as any follow-up information.

## 2025-03-19 LAB — SURGICAL PATHOLOGY STUDY: SIGNIFICANT CHANGE UP

## 2025-03-25 PROBLEM — E11.9 TYPE 2 DIABETES MELLITUS WITHOUT COMPLICATIONS: Chronic | Status: ACTIVE | Noted: 2025-03-10

## 2025-03-25 PROBLEM — J44.9 CHRONIC OBSTRUCTIVE PULMONARY DISEASE, UNSPECIFIED: Chronic | Status: ACTIVE | Noted: 2025-03-10

## 2025-03-25 PROBLEM — E66.9 OBESITY, UNSPECIFIED: Chronic | Status: ACTIVE | Noted: 2025-03-10

## 2025-03-27 ENCOUNTER — NON-APPOINTMENT (OUTPATIENT)
Age: 60
End: 2025-03-27

## 2025-03-27 ENCOUNTER — APPOINTMENT (OUTPATIENT)
Dept: UROLOGY | Facility: CLINIC | Age: 60
End: 2025-03-27
Payer: MEDICAID

## 2025-03-27 VITALS
DIASTOLIC BLOOD PRESSURE: 82 MMHG | TEMPERATURE: 98.2 F | RESPIRATION RATE: 17 BRPM | HEART RATE: 80 BPM | WEIGHT: 225 LBS | HEIGHT: 72 IN | SYSTOLIC BLOOD PRESSURE: 149 MMHG | BODY MASS INDEX: 30.48 KG/M2

## 2025-03-27 DIAGNOSIS — C61 MALIGNANT NEOPLASM OF PROSTATE: ICD-10-CM

## 2025-03-27 PROCEDURE — G2211 COMPLEX E/M VISIT ADD ON: CPT | Mod: NC

## 2025-03-27 PROCEDURE — 99215 OFFICE O/P EST HI 40 MIN: CPT

## 2025-04-30 DIAGNOSIS — C61 MALIGNANT NEOPLASM OF PROSTATE: ICD-10-CM

## 2025-05-20 ENCOUNTER — APPOINTMENT (OUTPATIENT)
Dept: GASTROENTEROLOGY | Facility: CLINIC | Age: 60
End: 2025-05-20
Payer: MEDICAID

## 2025-05-20 VITALS
OXYGEN SATURATION: 95 % | SYSTOLIC BLOOD PRESSURE: 112 MMHG | BODY MASS INDEX: 31.56 KG/M2 | DIASTOLIC BLOOD PRESSURE: 66 MMHG | WEIGHT: 233 LBS | HEIGHT: 72 IN | TEMPERATURE: 98.3 F

## 2025-05-20 DIAGNOSIS — J44.9 CHRONIC OBSTRUCTIVE PULMONARY DISEASE, UNSPECIFIED: ICD-10-CM

## 2025-05-20 DIAGNOSIS — Z12.12 ENCOUNTER FOR SCREENING FOR MALIGNANT NEOPLASM OF COLON: ICD-10-CM

## 2025-05-20 DIAGNOSIS — C61 MALIGNANT NEOPLASM OF PROSTATE: ICD-10-CM

## 2025-05-20 DIAGNOSIS — Z78.9 OTHER SPECIFIED HEALTH STATUS: ICD-10-CM

## 2025-05-20 DIAGNOSIS — Z12.11 ENCOUNTER FOR SCREENING FOR MALIGNANT NEOPLASM OF COLON: ICD-10-CM

## 2025-05-20 DIAGNOSIS — F17.200 NICOTINE DEPENDENCE, UNSPECIFIED, UNCOMPLICATED: ICD-10-CM

## 2025-05-20 DIAGNOSIS — E11.9 TYPE 2 DIABETES MELLITUS W/OUT COMPLICATIONS: ICD-10-CM

## 2025-05-20 PROCEDURE — 99204 OFFICE O/P NEW MOD 45 MIN: CPT

## 2025-05-20 RX ORDER — TIRZEPATIDE 2.5 MG/.5ML
2.5 INJECTION, SOLUTION SUBCUTANEOUS WEEKLY
Qty: 4 | Refills: 0 | Status: ACTIVE | COMMUNITY
Start: 2025-05-20

## 2025-05-20 RX ORDER — POLYETHYLENE GLYCOL 3350, SODIUM CHLORIDE, SODIUM BICARBONATE AND POTASSIUM CHLORIDE WITH LEMON FLAVOR 420; 11.2; 5.72; 1.48 G/4L; G/4L; G/4L; G/4L
420 POWDER, FOR SOLUTION ORAL
Qty: 4000 | Refills: 0 | Status: ACTIVE | COMMUNITY
Start: 2025-05-20 | End: 1900-01-01

## 2025-06-10 ENCOUNTER — APPOINTMENT (OUTPATIENT)
Dept: PULMONOLOGY | Facility: CLINIC | Age: 60
End: 2025-06-10
Payer: MEDICAID

## 2025-06-10 VITALS — HEART RATE: 86 BPM | DIASTOLIC BLOOD PRESSURE: 71 MMHG | SYSTOLIC BLOOD PRESSURE: 144 MMHG | OXYGEN SATURATION: 90 %

## 2025-06-10 VITALS — OXYGEN SATURATION: 96 %

## 2025-06-10 PROBLEM — F17.210 CIGARETTE NICOTINE DEPENDENCE WITHOUT COMPLICATION: Status: ACTIVE | Noted: 2025-06-10

## 2025-06-10 PROBLEM — R93.89 ABNORMAL CHEST X-RAY: Status: ACTIVE | Noted: 2025-06-10

## 2025-06-10 LAB — POCT - HEMOGLOBIN (HGB), QUANTITATIVE, TRANSCUTANEOUS: 16.3

## 2025-06-10 PROCEDURE — 88738 HGB QUANT TRANSCUTANEOUS: CPT

## 2025-06-10 PROCEDURE — 99214 OFFICE O/P EST MOD 30 MIN: CPT | Mod: 25

## 2025-06-10 PROCEDURE — 94727 GAS DIL/WSHOT DETER LNG VOL: CPT

## 2025-06-10 PROCEDURE — ZZZZZ: CPT

## 2025-06-10 PROCEDURE — 94010 BREATHING CAPACITY TEST: CPT

## 2025-06-10 PROCEDURE — 94729 DIFFUSING CAPACITY: CPT

## 2025-06-10 PROCEDURE — 71046 X-RAY EXAM CHEST 2 VIEWS: CPT

## 2025-06-10 RX ORDER — BUDESONIDE AND FORMOTEROL FUMARATE DIHYDRATE 160; 4.5 UG/1; UG/1
160-4.5 AEROSOL RESPIRATORY (INHALATION)
Qty: 10.2 | Refills: 3 | Status: ACTIVE | COMMUNITY
Start: 2025-06-10 | End: 1900-01-01

## 2025-06-17 ENCOUNTER — APPOINTMENT (OUTPATIENT)
Dept: CT IMAGING | Facility: IMAGING CENTER | Age: 60
End: 2025-06-17
Payer: MEDICAID

## 2025-06-17 ENCOUNTER — OUTPATIENT (OUTPATIENT)
Dept: OUTPATIENT SERVICES | Facility: HOSPITAL | Age: 60
LOS: 1 days | End: 2025-06-17
Payer: MEDICAID

## 2025-06-17 DIAGNOSIS — Z90.89 ACQUIRED ABSENCE OF OTHER ORGANS: Chronic | ICD-10-CM

## 2025-06-17 DIAGNOSIS — R93.89 ABNORMAL FINDINGS ON DIAGNOSTIC IMAGING OF OTHER SPECIFIED BODY STRUCTURES: ICD-10-CM

## 2025-06-17 DIAGNOSIS — Z98.890 OTHER SPECIFIED POSTPROCEDURAL STATES: Chronic | ICD-10-CM

## 2025-06-17 PROCEDURE — 71250 CT THORAX DX C-: CPT | Mod: 26

## 2025-06-17 PROCEDURE — 71250 CT THORAX DX C-: CPT

## 2025-07-01 ENCOUNTER — APPOINTMENT (OUTPATIENT)
Dept: PULMONOLOGY | Facility: CLINIC | Age: 60
End: 2025-07-01
Payer: MEDICAID

## 2025-07-01 VITALS — HEART RATE: 101 BPM | OXYGEN SATURATION: 95 % | SYSTOLIC BLOOD PRESSURE: 143 MMHG | DIASTOLIC BLOOD PRESSURE: 73 MMHG

## 2025-07-01 PROBLEM — J98.4 MULTIPLE IDIOPATHIC CYSTS OF LUNG: Status: ACTIVE | Noted: 2025-07-01

## 2025-07-01 PROCEDURE — 99214 OFFICE O/P EST MOD 30 MIN: CPT

## 2025-07-01 PROCEDURE — G2211 COMPLEX E/M VISIT ADD ON: CPT | Mod: NC

## 2025-07-01 RX ORDER — ALBUTEROL SULFATE 90 UG/1
108 (90 BASE) INHALANT RESPIRATORY (INHALATION)
Qty: 1 | Refills: 5 | Status: ACTIVE | COMMUNITY
Start: 2025-07-01 | End: 1900-01-01

## 2025-08-27 ENCOUNTER — APPOINTMENT (OUTPATIENT)
Dept: GASTROENTEROLOGY | Facility: AMBULATORY MEDICAL SERVICES | Age: 60
End: 2025-08-27

## (undated) DEVICE — GRID BRACHYTHERAPY EZ 18G

## (undated) DEVICE — DRSG TELFA 3 X 8

## (undated) DEVICE — VENODYNE/SCD SLEEVE CALF MEDIUM

## (undated) DEVICE — WARMING BLANKET UPPER ADULT

## (undated) DEVICE — Device

## (undated) DEVICE — NDL SPINAL 20G X 6" QUINCKE

## (undated) DEVICE — CATH ANGIO 14G X 3.25"

## (undated) DEVICE — NDL MAX CORE 18G X 25CM

## (undated) DEVICE — POSITIONER FOAM EGG CRATE ULNAR 2PCS (PINK)

## (undated) DEVICE — GLV 7.5 PROTEXIS (WHITE)

## (undated) DEVICE — BALLOON ENDOCAVITY 2X14CM

## (undated) DEVICE — PREP CHLORAPREP HI-LITE ORANGE 3ML

## (undated) DEVICE — BASIN SET DOUBLE

## (undated) DEVICE — SYR LUER LOK 20CC

## (undated) DEVICE — NEOGUARD ENDOCAVITY PROBE COVER 1 X 11.8"

## (undated) DEVICE — DRAPE BACK TABLE COVER 44X90"